# Patient Record
Sex: FEMALE | Race: WHITE | Employment: OTHER | URBAN - NONMETROPOLITAN AREA
[De-identification: names, ages, dates, MRNs, and addresses within clinical notes are randomized per-mention and may not be internally consistent; named-entity substitution may affect disease eponyms.]

---

## 2017-03-21 ENCOUNTER — TELEPHONE (OUTPATIENT)
Dept: INTERNAL MEDICINE | Age: 62
End: 2017-03-21

## 2017-03-21 RX ORDER — ESTRADIOL 0.5 MG/1
0.5 TABLET ORAL DAILY
Qty: 90 TABLET | Refills: 3 | Status: SHIPPED | OUTPATIENT
Start: 2017-03-21 | End: 2018-04-23 | Stop reason: SDUPTHER

## 2017-05-24 LAB
CHOLESTEROL, TOTAL: 189 MG/DL
CHOLESTEROL/HDL RATIO: 3.4
HBA1C MFR BLD: NORMAL %
HDLC SERPL-MCNC: 56 MG/DL (ref 35–70)
LDL CHOLESTEROL CALCULATED: 90 MG/DL (ref 0–160)
TRIGL SERPL-MCNC: 215 MG/DL
VLDLC SERPL CALC-MCNC: 43 MG/DL

## 2017-05-25 ENCOUNTER — TELEPHONE (OUTPATIENT)
Dept: FAMILY MEDICINE CLINIC | Age: 62
End: 2017-05-25

## 2017-05-30 ENCOUNTER — TELEPHONE (OUTPATIENT)
Dept: FAMILY MEDICINE CLINIC | Age: 62
End: 2017-05-30

## 2017-05-30 DIAGNOSIS — E03.9 HYPOTHYROIDISM, UNSPECIFIED TYPE: Primary | ICD-10-CM

## 2017-07-06 ENCOUNTER — OFFICE VISIT (OUTPATIENT)
Dept: FAMILY MEDICINE CLINIC | Age: 62
End: 2017-07-06
Payer: COMMERCIAL

## 2017-07-06 VITALS
HEART RATE: 68 BPM | HEIGHT: 63 IN | WEIGHT: 157 LBS | DIASTOLIC BLOOD PRESSURE: 70 MMHG | BODY MASS INDEX: 27.82 KG/M2 | SYSTOLIC BLOOD PRESSURE: 124 MMHG

## 2017-07-06 DIAGNOSIS — K21.9 GASTROESOPHAGEAL REFLUX DISEASE WITHOUT ESOPHAGITIS: Primary | ICD-10-CM

## 2017-07-06 DIAGNOSIS — E78.00 PURE HYPERCHOLESTEROLEMIA: ICD-10-CM

## 2017-07-06 DIAGNOSIS — Z79.890 HORMONE REPLACEMENT THERAPY: ICD-10-CM

## 2017-07-06 DIAGNOSIS — I87.2 VENOUS INSUFFICIENCY: ICD-10-CM

## 2017-07-06 DIAGNOSIS — R79.89 TSH ELEVATION: ICD-10-CM

## 2017-07-06 PROCEDURE — 99214 OFFICE O/P EST MOD 30 MIN: CPT | Performed by: FAMILY MEDICINE

## 2017-07-06 RX ORDER — CHOLECALCIFEROL (VITAMIN D3) 125 MCG
500 CAPSULE ORAL DAILY
COMMUNITY
End: 2017-07-06 | Stop reason: SDUPTHER

## 2017-07-06 RX ORDER — SIMVASTATIN 20 MG
20 TABLET ORAL NIGHTLY
Qty: 90 TABLET | Refills: 3 | Status: SHIPPED | OUTPATIENT
Start: 2017-07-06 | End: 2017-09-18 | Stop reason: SDUPTHER

## 2017-07-06 ASSESSMENT — ENCOUNTER SYMPTOMS
ALLERGIC/IMMUNOLOGIC NEGATIVE: 1
RESPIRATORY NEGATIVE: 1
BACK PAIN: 0
EYES NEGATIVE: 1
GASTROINTESTINAL NEGATIVE: 1

## 2017-07-06 ASSESSMENT — PATIENT HEALTH QUESTIONNAIRE - PHQ9
SUM OF ALL RESPONSES TO PHQ9 QUESTIONS 1 & 2: 0
SUM OF ALL RESPONSES TO PHQ QUESTIONS 1-9: 0
2. FEELING DOWN, DEPRESSED OR HOPELESS: 0
1. LITTLE INTEREST OR PLEASURE IN DOING THINGS: 0

## 2017-08-08 ENCOUNTER — OFFICE VISIT (OUTPATIENT)
Dept: OBGYN | Age: 62
End: 2017-08-08
Payer: COMMERCIAL

## 2017-08-08 ENCOUNTER — HOSPITAL ENCOUNTER (OUTPATIENT)
Age: 62
Setting detail: SPECIMEN
Discharge: HOME | End: 2017-08-08
Payer: COMMERCIAL

## 2017-08-08 VITALS
HEART RATE: 78 BPM | DIASTOLIC BLOOD PRESSURE: 80 MMHG | BODY MASS INDEX: 25.81 KG/M2 | HEIGHT: 64 IN | SYSTOLIC BLOOD PRESSURE: 124 MMHG | WEIGHT: 151.2 LBS

## 2017-08-08 DIAGNOSIS — Z01.419 WELL FEMALE EXAM WITH ROUTINE GYNECOLOGICAL EXAM: Primary | ICD-10-CM

## 2017-08-08 DIAGNOSIS — Z12.72 VAGINAL PAP SMEAR: ICD-10-CM

## 2017-08-08 DIAGNOSIS — Z12.31 SCREENING MAMMOGRAM, ENCOUNTER FOR: ICD-10-CM

## 2017-08-08 PROCEDURE — 99396 PREV VISIT EST AGE 40-64: CPT | Performed by: NURSE PRACTITIONER

## 2017-08-10 LAB — CYTOLOGY REPORT: NORMAL

## 2017-08-14 ENCOUNTER — OFFICE VISIT (OUTPATIENT)
Dept: OBGYN | Age: 62
End: 2017-08-14
Payer: COMMERCIAL

## 2017-08-14 VITALS
HEART RATE: 84 BPM | WEIGHT: 151.6 LBS | BODY MASS INDEX: 25.88 KG/M2 | SYSTOLIC BLOOD PRESSURE: 140 MMHG | DIASTOLIC BLOOD PRESSURE: 82 MMHG | HEIGHT: 64 IN

## 2017-08-14 DIAGNOSIS — L73.9 FOLLICULITIS: Primary | ICD-10-CM

## 2017-08-14 PROCEDURE — 99213 OFFICE O/P EST LOW 20 MIN: CPT | Performed by: NURSE PRACTITIONER

## 2017-09-19 RX ORDER — SIMVASTATIN 20 MG
TABLET ORAL
Qty: 90 TABLET | Refills: 1 | Status: SHIPPED | OUTPATIENT
Start: 2017-09-19 | End: 2018-03-23 | Stop reason: SDUPTHER

## 2018-01-02 ENCOUNTER — TELEPHONE (OUTPATIENT)
Dept: OBGYN | Age: 63
End: 2018-01-02

## 2018-01-04 ENCOUNTER — TELEPHONE (OUTPATIENT)
Dept: OBGYN | Age: 63
End: 2018-01-04

## 2018-01-04 RX ORDER — TRIAMCINOLONE ACETONIDE 1 MG/G
CREAM TOPICAL
Qty: 28 G | Refills: 1 | Status: SHIPPED | OUTPATIENT
Start: 2018-01-04 | End: 2018-08-23 | Stop reason: ALTCHOICE

## 2018-01-04 NOTE — TELEPHONE ENCOUNTER
Spoke with patient by phone. Complaining of itching and vaginal discharge. Minimal relief with OTC Monistat. Rx's sent to her pharmacy.   She will make appointment if symptoms persist

## 2018-03-24 RX ORDER — SIMVASTATIN 20 MG
TABLET ORAL
Qty: 90 TABLET | Refills: 3 | Status: SHIPPED | OUTPATIENT
Start: 2018-03-24 | End: 2019-03-21 | Stop reason: SDUPTHER

## 2018-04-24 RX ORDER — ESTRADIOL 0.5 MG/1
TABLET ORAL
Qty: 90 TABLET | Refills: 3 | Status: ON HOLD | OUTPATIENT
Start: 2018-04-24 | End: 2018-10-03

## 2018-06-06 LAB
CHOLESTEROL, TOTAL: 210 MG/DL
CHOLESTEROL/HDL RATIO: 4
CREATININE: 0.6 MG/DL
HDLC SERPL-MCNC: 53 MG/DL (ref 35–70)
LDL CHOLESTEROL CALCULATED: 129 MG/DL (ref 0–160)
TRIGL SERPL-MCNC: 142 MG/DL
VLDLC SERPL CALC-MCNC: 28 MG/DL

## 2018-08-23 ENCOUNTER — OFFICE VISIT (OUTPATIENT)
Dept: OBGYN | Age: 63
End: 2018-08-23
Payer: COMMERCIAL

## 2018-08-23 ENCOUNTER — HOSPITAL ENCOUNTER (OUTPATIENT)
Age: 63
Setting detail: SPECIMEN
Discharge: HOME | End: 2018-08-23
Payer: COMMERCIAL

## 2018-08-23 VITALS
RESPIRATION RATE: 16 BRPM | HEART RATE: 72 BPM | BODY MASS INDEX: 25.95 KG/M2 | SYSTOLIC BLOOD PRESSURE: 126 MMHG | DIASTOLIC BLOOD PRESSURE: 80 MMHG | WEIGHT: 152 LBS | HEIGHT: 64 IN

## 2018-08-23 DIAGNOSIS — Z78.0 MENOPAUSE: ICD-10-CM

## 2018-08-23 DIAGNOSIS — N63.10 LUMP OF RIGHT BREAST: ICD-10-CM

## 2018-08-23 DIAGNOSIS — Z12.31 VISIT FOR SCREENING MAMMOGRAM: ICD-10-CM

## 2018-08-23 DIAGNOSIS — Z12.72 SCREENING FOR VAGINAL CANCER: ICD-10-CM

## 2018-08-23 DIAGNOSIS — Z01.419 WELL FEMALE EXAM WITH ROUTINE GYNECOLOGICAL EXAM: Primary | ICD-10-CM

## 2018-08-23 PROCEDURE — G0145 SCR C/V CYTO,THINLAYER,RESCR: HCPCS

## 2018-08-23 PROCEDURE — 99396 PREV VISIT EST AGE 40-64: CPT | Performed by: NURSE PRACTITIONER

## 2018-08-23 NOTE — PROGRESS NOTES
Katarzyna Sow  2018              58 y.o. Chief Complaint   Patient presents with   [facility] Gynecologic Exam     lower back pain yesterday feels better today. No LMP recorded. Patient has had a hysterectomy. No referring provider defined for this encounter. HPI :Annual Exam  Patient presents for annual exam.  Counseling on healthy lifestyle reviewed, as well as the need for self breast exam. We reviewed the need for Kegal exercises. Good bladder control. We discussed and reviewed the need for routine screenings and immunization updates when appropriate. Performs monthly self breast exams. Mammogram scheduled tomorrow. On estradiol tablets and wishes to continue. We reviewed the pros, cons, risks, benefits, side effects and proper compliance. Patient verbalized understanding. Will call when in need of refill. Performs monthly self breast exams. The patient is sexually active. last pap: was normal, last mammogram: was normal  The patient has regular exercise: yes .  We did review the need for and frequency of both weight bearing and strengthening as tolerated by the patient. ________________________________________________________________________  Obstetric History       T3      L3     SAB0   TAB0   Ectopic0   Molar0   Multiple0   Live Births0       # Outcome Date GA Lbr Bubba/2nd Weight Sex Delivery Anes PTL Lv   3 Term     F Vag-Spont      2 Term     F Vag-Spont      1 Term     M Vag-Spont           Past Medical History:   Diagnosis Date    GERD (gastroesophageal reflux disease)     High cholesterol     Hormone replacement therapy     Postsurgical menopause     Age 49                                                                   Past Surgical History:   Procedure Laterality Date    COLONOSCOPY      normal -10 yrs    CYSTOCELE REPAIR      INGUINAL HERNIA REPAIR Right 06    ROTATOR CUFF REPAIR Right     ROTATOR CUFF REPAIR Left     Testing:  Date of Last Pap Smear: 2018  Date of Last Mammogram: 2017  Date of Last Colonoscopy: 2014  Date of Last Bone Density: many years  ________________________________________________________________________  REVIEW OF SYSTEMS:     A minimum of an eleven point review of systems was completed. Review Of Systems (11 point):  General ROS:  negative  Hematological and Lymphatic ROS:negative   Breast ROS: negative  Cardiovascular ROS: negative  Respiratory ROS: negative   Gastrointestinal ROS: negative  Genito-Urinary ROS: negative  Psychological ROS: negative  Neurological ROS: negative  Musculoskeletal ROS: negative  Dermatological ROS: negative                                                                                                                                                                                   PHYSICAL Exam:     Constitutional:  Blood pressure 126/80, pulse 72, resp. rate 16, height 5' 4\" (1.626 m), weight 152 lb (68.9 kg), not currently breastfeeding. General Appearance: This  is a well Developed, well Nourished, well groomed female. Her BMI was reviewed. Skin:  There was a Normal Inspection of the skin without rashes or lesions. There were no rashes. (Papular, Maculopapular, Hives, Pustular, Macular)     There were no lesions (Ulcers, Erythema, Abn. Appearing Nevi)      Lymphatic:  No Lymph Nodes were Palpable in the neck , axilla or groin. Neck and EENT:  The neck was supple. There were no masses   The thyroid was not enlarged and had no masses. PERRLA, Nares Patent No Masses    Respiratory: The lungs were auscultated and found to be clear. There were no rales, rhonchi or wheezes. There was a good respiratory effort. Cardiovascular: The heart was in a regular rate and rhythm. . No S3 or S4. There was no murmur appreciated. Extremities: The patients extremities were without calf tenderness, edema, or varicosities.   There was full range of motion in all four extremities. Pulses in all four extremities    Abdomen: The abdomen was soft and non-tender. There were good bowel sounds in all quadrants and there was no guarding, rebound or rigidity. On evaluation there was no evidence of hepatosplenomegaly and there was no costal vertebral chepe tenderness bilaterally. No hernias were appreciated. Psych: The patient had a normal Orientation to: Time, Place, Person, and Situation  Mood and affect appropriate    Breast:  (Chest)  normal appearance, no masses or tenderness, Inspection negative, No nipple retraction or dimpling, No nipple discharge or bleeding, No axillary or supraclavicular adenopathy, Normal to palpation without dominant masses, negative findings: normal in size and symmetry, normal contour with no evidence of flattening or dimpling, skin normal, nipples everted without rashes or discharge, palpation negative for masses or nodules, no palpable axillary lymphadenopathy      Pelvic Exam:  External genitalia: normal general appearance  Urinary system: urethral meatus normal  Vaginal: normal mucosa without prolapse or lesions, normal without tenderness, induration or masses and normal rugae  Cervix: absent and removed surgically  Adnexa: normal bimanual exam and non palpable  Uterus: absent and removed surgically  Thin prep Pap obtained from vaginal apex     Musculosk:  Normal Gait and station was noted. Digits were evaluated without abnormal findings. Range of motion, stability and strength were evaluated and found to be appropriate for the patients age. ASSESSMENT:      58 y.o. Annual   Diagnosis Orders   1. Well female exam with routine gynecological exam     2. Screening for vaginal cancer     3. Visit for screening mammogram          Chief Complaint   Patient presents with   [facility] Gynecologic Exam     lower back pain yesterday feels better today.           Past Medical History:   Diagnosis Date    GERD (gastroesophageal reflux disease)     High cholesterol     Hormone replacement therapy     Postsurgical menopause 2003    Age 49         Patient Active Problem List   Diagnosis    GERD (gastroesophageal reflux disease)    Hormone replacement therapy          Hereditary Breast, Ovarian, Colon and Uterine Cancer screening Done. Tobacco & Secondary smoke risks reviewed; instructed on cessation and avoidance    PLAN:  Return in about 1 year (around 8/23/2019) for Annual Exam.  Repeat pap per ASCCP 2013 guidelines  Return for annual exams  Mammograms every 1 year. If 36 yo and last mammogram was negative. Routine health maintenance per patients PCP. No orders of the defined types were placed in this encounter.       Jayne Morillo  8/23/2018      (Please note that portions of this note were completed with a voice-recognition program. Efforts were made to edit the dictations but occasionally words are mis-transcribed.)

## 2018-08-24 ENCOUNTER — HOSPITAL ENCOUNTER (OUTPATIENT)
Dept: ULTRASOUND IMAGING | Age: 63
Discharge: HOME | End: 2018-08-26
Payer: COMMERCIAL

## 2018-08-24 ENCOUNTER — HOSPITAL ENCOUNTER (OUTPATIENT)
Dept: MAMMOGRAPHY | Age: 63
Discharge: HOME | End: 2018-08-26
Payer: COMMERCIAL

## 2018-08-24 DIAGNOSIS — R92.8 ABNORMAL MAMMOGRAM: ICD-10-CM

## 2018-08-24 DIAGNOSIS — N63.10 LUMP OF RIGHT BREAST: ICD-10-CM

## 2018-08-24 PROCEDURE — 77066 DX MAMMO INCL CAD BI: CPT

## 2018-08-24 PROCEDURE — 76642 ULTRASOUND BREAST LIMITED: CPT

## 2018-08-28 DIAGNOSIS — N63.10 LUMP OF RIGHT BREAST: Primary | ICD-10-CM

## 2018-08-30 ENCOUNTER — TELEPHONE (OUTPATIENT)
Dept: SURGERY | Age: 63
End: 2018-08-30

## 2018-09-05 ENCOUNTER — INITIAL CONSULT (OUTPATIENT)
Dept: SURGERY | Age: 63
End: 2018-09-05
Payer: COMMERCIAL

## 2018-09-05 ENCOUNTER — TELEPHONE (OUTPATIENT)
Dept: SURGERY | Age: 63
End: 2018-09-05

## 2018-09-05 VITALS
HEIGHT: 64 IN | DIASTOLIC BLOOD PRESSURE: 54 MMHG | HEART RATE: 54 BPM | SYSTOLIC BLOOD PRESSURE: 106 MMHG | BODY MASS INDEX: 25.61 KG/M2 | WEIGHT: 150 LBS

## 2018-09-05 DIAGNOSIS — N63.10 BREAST MASS, RIGHT: Primary | ICD-10-CM

## 2018-09-05 PROCEDURE — 99214 OFFICE O/P EST MOD 30 MIN: CPT | Performed by: SURGERY

## 2018-09-05 RX ORDER — LANOLIN ALCOHOL/MO/W.PET/CERES
1000 CREAM (GRAM) TOPICAL DAILY
COMMUNITY

## 2018-09-05 RX ORDER — ACETAMINOPHEN 160 MG
TABLET,DISINTEGRATING ORAL
COMMUNITY

## 2018-09-05 NOTE — PROGRESS NOTES
 Other Sister         Graves Disease    Cancer Sister         thyroid cancer    Thyroid Disease Sister        Social History     Social History    Marital status:      Spouse name: N/A    Number of children: N/A    Years of education: N/A     Occupational History    Not on file. Social History Main Topics    Smoking status: Never Smoker    Smokeless tobacco: Never Used    Alcohol use 0.0 oz/week      Comment: occassionally    Drug use: No    Sexual activity: Yes     Partners: Male     Other Topics Concern    Not on file     Social History Narrative    No narrative on file       Past Surgical History:   Procedure Laterality Date   Susie Figueroa Left 2018    Dr. Daniel Gonzalez  06/14    normal -10 yrs   [facility] Right 01/24/06    ROTATOR CUFF REPAIR Right 2012    ROTATOR CUFF REPAIR Left 02/14    SERENITY AND BSO      TONSILLECTOMY      TUBAL LIGATION         Past Medical History:   Diagnosis Date    GERD (gastroesophageal reflux disease)     High cholesterol     Hormone replacement therapy     Postsurgical menopause 2003    Age 49       Current Outpatient Prescriptions on File Prior to Visit   Medication Sig Dispense Refill    simvastatin (ZOCOR) 20 MG tablet TAKE 1 TABLET NIGHTLY 90 tablet 3    Ascorbic Acid (VITAMIN C) 500 MG tablet Take 500 mg by mouth daily      Multiple Vitamins-Minerals (MULTIVITAMIN PO) Take 1 capsule by mouth daily      omeprazole (PRILOSEC) 20 MG capsule Take 20 mg by mouth daily.  estradiol (ESTRACE) 0.5 MG tablet TAKE ONE TABLET BY MOUTH ONCE DAILY 90 tablet 3     No current facility-administered medications on file prior to visit.         Allergies as of 09/05/2018 - Review Complete 09/05/2018   Allergen Reaction Noted    Lipitor [atorvastatin] Other (See Comments) 07/27/2015    Percocet [oxycodone-acetaminophen] Nausea Only 11/07/2013    Vicodin [hydrocodone-acetaminophen] Nausea Only 11/07/2013         PHYSICAL EXAM:    Blood pressure (!) 106/54, pulse 54, height 5' 4\" (1.626 m), weight 150 lb (68 kg), not currently breastfeeding. Gen:  A and O x 3, NAD, well nourished  Eyes:  Sclera non icterus, PERRL  Head:  Normocephalic, non-tender  Neck:  Supple, no adenopathy, thyroid non tender and no masses,no carotid bruits  Lungs:  CTA, symmetrical  Chest:  RRR, no murmurs  Abd:  Soft, NT, ND, no HSM, no hernias, no bruits  Ext:  No edema, no cyanosis  Psych: reveals appropriate mood, memory and judgment,  Neuro:  Reveals no gross motor or sensory deficits,   Msk:  5/5 strength all 4 extremities, no joint tenderness        Breast Exam:    Patient examined in sitting position with hands on hips. Nipple retraction: {YNo  Skin dimpling: No  Erythema:  No  Asymmetry: No  Axillary Lymphadenopathy:  No  Supra Clavicular Lymphadenopathy: No    Patient placed in a supine position. Right breast mass:  Yes,  There is about a 1.5 by 1.5 cm mass in the 6 oclock position and about 1-2 cm inferior to the areolar complex, mobile  Left breast mass:  No  Tenderness:  No        ASSESS MENT:    1. A mass right, 6 oclock 2 cm inferior to nipple. US shows complicated cyst.  We dicussed tx options , observation vs exc bx. We discussed the option of observation vs exc bx. There is a small risk of complicated cyst being cancer. They should be checked with US in and exam in 6 months or bxed. A simple cyst can be watched but a complicated and/or complex cysts should be followed clsoely or bx. Pt  Would like to proceed with exc bx    PLAN:    1.   Will set up for exc bx of mass right breast

## 2018-09-10 DIAGNOSIS — Z01.818 PRE-OP TESTING: Primary | ICD-10-CM

## 2018-09-11 LAB — CYTOLOGY REPORT: NORMAL

## 2018-09-13 ENCOUNTER — HOSPITAL ENCOUNTER (OUTPATIENT)
Dept: LAB | Age: 63
Setting detail: SPECIMEN
Discharge: HOME | End: 2018-09-13
Payer: COMMERCIAL

## 2018-09-13 ENCOUNTER — HOSPITAL ENCOUNTER (OUTPATIENT)
Dept: NON INVASIVE DIAGNOSTICS | Age: 63
Discharge: HOME | End: 2018-09-13
Payer: COMMERCIAL

## 2018-09-13 DIAGNOSIS — Z01.818 PRE-OP TESTING: ICD-10-CM

## 2018-09-13 LAB
ABSOLUTE EOS #: 0.3 K/UL (ref 0–0.4)
ABSOLUTE IMMATURE GRANULOCYTE: NORMAL K/UL (ref 0–0.3)
ABSOLUTE LYMPH #: 1.8 K/UL (ref 1–4.8)
ABSOLUTE MONO #: 0.5 K/UL (ref 0.1–1.2)
ANION GAP SERPL CALCULATED.3IONS-SCNC: 9 MMOL/L (ref 9–17)
BASOPHILS # BLD: 1 % (ref 0–1)
BASOPHILS ABSOLUTE: 0 K/UL (ref 0–0.2)
BUN BLDV-MCNC: 13 MG/DL (ref 8–23)
BUN/CREAT BLD: 19 (ref 9–20)
CALCIUM SERPL-MCNC: 10.2 MG/DL (ref 8.6–10.4)
CHLORIDE BLD-SCNC: 104 MMOL/L (ref 98–107)
CO2: 31 MMOL/L (ref 20–31)
CREAT SERPL-MCNC: 0.69 MG/DL (ref 0.5–0.9)
DIFFERENTIAL TYPE: NORMAL
EKG ATRIAL RATE: 53 BPM
EKG P AXIS: 54 DEGREES
EKG P-R INTERVAL: 148 MS
EKG Q-T INTERVAL: 442 MS
EKG QRS DURATION: 88 MS
EKG QTC CALCULATION (BAZETT): 414 MS
EKG R AXIS: -9 DEGREES
EKG T AXIS: -9 DEGREES
EKG VENTRICULAR RATE: 53 BPM
EOSINOPHILS RELATIVE PERCENT: 5 % (ref 1–7)
GFR AFRICAN AMERICAN: >60 ML/MIN
GFR NON-AFRICAN AMERICAN: >60 ML/MIN
GFR SERPL CREATININE-BSD FRML MDRD: NORMAL ML/MIN/{1.73_M2}
GFR SERPL CREATININE-BSD FRML MDRD: NORMAL ML/MIN/{1.73_M2}
GLUCOSE BLD-MCNC: 90 MG/DL (ref 70–99)
HCT VFR BLD CALC: 43.4 % (ref 36–46)
HEMOGLOBIN: 14.6 G/DL (ref 12–16)
IMMATURE GRANULOCYTES: NORMAL %
LYMPHOCYTES # BLD: 33 % (ref 16–46)
MCH RBC QN AUTO: 30.7 PG (ref 26–34)
MCHC RBC AUTO-ENTMCNC: 33.8 G/DL (ref 31–37)
MCV RBC AUTO: 91 FL (ref 80–100)
MONOCYTES # BLD: 9 % (ref 4–11)
NRBC AUTOMATED: NORMAL PER 100 WBC
PDW BLD-RTO: 13.2 % (ref 11–14.5)
PLATELET # BLD: 247 K/UL (ref 140–450)
PLATELET ESTIMATE: NORMAL
PMV BLD AUTO: 7.9 FL (ref 6–12)
POTASSIUM SERPL-SCNC: 4 MMOL/L (ref 3.7–5.3)
RBC # BLD: 4.76 M/UL (ref 4–5.2)
RBC # BLD: NORMAL 10*6/UL
SEG NEUTROPHILS: 52 % (ref 43–77)
SEGMENTED NEUTROPHILS ABSOLUTE COUNT: 2.9 K/UL (ref 1.8–7.7)
SODIUM BLD-SCNC: 144 MMOL/L (ref 135–144)
WBC # BLD: 5.5 K/UL (ref 3.5–11)
WBC # BLD: NORMAL 10*3/UL

## 2018-09-13 PROCEDURE — 80048 BASIC METABOLIC PNL TOTAL CA: CPT

## 2018-09-13 PROCEDURE — 93005 ELECTROCARDIOGRAM TRACING: CPT

## 2018-09-13 PROCEDURE — 36415 COLL VENOUS BLD VENIPUNCTURE: CPT

## 2018-09-13 PROCEDURE — 85025 COMPLETE CBC W/AUTO DIFF WBC: CPT

## 2018-09-14 NOTE — TELEPHONE ENCOUNTER
Patient had labs and an EKG. She is seeing Dr. Castro Keane on 9/24/2018 for cardiac clearance.
Patient notified. She will come on Wed. Morning to get pre op testing.
Pt is 58 yrs old and has no ekg on file and last labs are from 2015. I would be interested in a fresh set of labs and an EKG for a baseline.  Ok to proceed to surgery after these come back normal.     Mona Rodriguez APRN - CRNA,MSN,9/6/2018 6:34 PM  Staff Anesthetist  [facility]  Dept of Anesthesia
Appointment for surgery Clearance scheduled for:None     Preoperative Testing: These are the current and completed labs:  CBC:   Lab Results   Component Value Date    WBC 4.9 10/10/2015    RBC 4.68 10/10/2015    HGB 14.3 10/10/2015    HCT 43.5 10/10/2015    MCV 92.9 10/10/2015    RDW 13.3 10/10/2015     10/10/2015     CMP:   Lab Results   Component Value Date     10/10/2015    K 4.3 10/10/2015     10/10/2015    CO2 29 10/10/2015    BUN 13 10/10/2015    CREATININE 0.6 06/06/2018    GFRAA >60 10/10/2015    LABGLOM >60 10/10/2015    GLUCOSE 89 10/10/2015    CALCIUM 9.4 10/10/2015    ALT 13 10/10/2015     POC Tests: No results for input(s): POCGLU, POCNA, POCK, POCCL, POCBUN, POCHEMO, POCHCT in the last 72 hours.   Coags  No results found for: PROTIME, INR, APTT  HCG (If Applicable) No results found for: PREGTESTUR, PREGSERUM, HCG, HCGQUANT   ABGs No results found for: PHART, PO2ART, DCI0DAQ, VJD2PUZ, BEART, W7QLVUBS   Type & Screen (If Applicable)  No results found for: Lenin Parson    Additional ordered pre-operative testing:  []CBC    []ABG      [] BMP   []URINALYSIS   []CMP    []HCG   []COAGS PT/INR  []T&C  []LFTs   []TYPE AND SCREEN    [] EKG  [] Chest X-Ray  [] Other Radiology    [] Sent to Hospitalist None  [x] Sent to Anesthesia for your review: yes   [] Additional Orders: None     Comments:None   Requests: None    Signed: Randall Singh LPN 3/8/8751 8:60 PM

## 2018-09-20 ENCOUNTER — OFFICE VISIT (OUTPATIENT)
Dept: FAMILY MEDICINE CLINIC | Age: 63
End: 2018-09-20
Payer: COMMERCIAL

## 2018-09-20 VITALS
RESPIRATION RATE: 12 BRPM | HEIGHT: 63 IN | SYSTOLIC BLOOD PRESSURE: 132 MMHG | WEIGHT: 151 LBS | HEART RATE: 60 BPM | BODY MASS INDEX: 26.75 KG/M2 | DIASTOLIC BLOOD PRESSURE: 78 MMHG

## 2018-09-20 DIAGNOSIS — Z23 NEED FOR SHINGLES VACCINE: ICD-10-CM

## 2018-09-20 DIAGNOSIS — E78.00 PURE HYPERCHOLESTEROLEMIA: ICD-10-CM

## 2018-09-20 DIAGNOSIS — I87.2 VENOUS INSUFFICIENCY: ICD-10-CM

## 2018-09-20 DIAGNOSIS — K21.9 GASTROESOPHAGEAL REFLUX DISEASE WITHOUT ESOPHAGITIS: Primary | ICD-10-CM

## 2018-09-20 DIAGNOSIS — Z79.890 HORMONE REPLACEMENT THERAPY: ICD-10-CM

## 2018-09-20 DIAGNOSIS — Z11.4 ENCOUNTER FOR SCREENING FOR HIV: ICD-10-CM

## 2018-09-20 DIAGNOSIS — R79.89 TSH ELEVATION: ICD-10-CM

## 2018-09-20 PROCEDURE — 99214 OFFICE O/P EST MOD 30 MIN: CPT | Performed by: FAMILY MEDICINE

## 2018-09-20 ASSESSMENT — PATIENT HEALTH QUESTIONNAIRE - PHQ9
2. FEELING DOWN, DEPRESSED OR HOPELESS: 0
SUM OF ALL RESPONSES TO PHQ9 QUESTIONS 1 & 2: 0
SUM OF ALL RESPONSES TO PHQ QUESTIONS 1-9: 0
SUM OF ALL RESPONSES TO PHQ QUESTIONS 1-9: 0
1. LITTLE INTEREST OR PLEASURE IN DOING THINGS: 0

## 2018-09-20 ASSESSMENT — ENCOUNTER SYMPTOMS
GASTROINTESTINAL NEGATIVE: 1
EYES NEGATIVE: 1
ALLERGIC/IMMUNOLOGIC NEGATIVE: 1
BACK PAIN: 0
RESPIRATORY NEGATIVE: 1

## 2018-09-20 NOTE — PATIENT INSTRUCTIONS
having a problem with your medicine. · Your doctor may recommend over-the-counter medicine. For mild or occasional indigestion, antacids, such as Tums, Gaviscon, Mylanta, or Maalox, may help. Your doctor also may recommend over-the-counter acid reducers, such as Pepcid AC, Tagamet HB, Zantac 75, or Prilosec. Read and follow all instructions on the label. If you use these medicines often, talk with your doctor. · Change your eating habits. ¨ It's best to eat several small meals instead of two or three large meals. ¨ After you eat, wait 2 to 3 hours before you lie down. ¨ Chocolate, mint, and alcohol can make GERD worse. ¨ Spicy foods, foods that have a lot of acid (like tomatoes and oranges), and coffee can make GERD symptoms worse in some people. If your symptoms are worse after you eat a certain food, you may want to stop eating that food to see if your symptoms get better. · Do not smoke or chew tobacco. Smoking can make GERD worse. If you need help quitting, talk to your doctor about stop-smoking programs and medicines. These can increase your chances of quitting for good. · If you have GERD symptoms at night, raise the head of your bed 6 to 8 inches by putting the frame on blocks or placing a foam wedge under the head of your mattress. (Adding extra pillows does not work.)  · Do not wear tight clothing around your middle. · Lose weight if you need to. Losing just 5 to 10 pounds can help. When should you call for help? Call your doctor now or seek immediate medical care if:    · You have new or different belly pain.     · Your stools are black and tarlike or have streaks of blood.    Watch closely for changes in your health, and be sure to contact your doctor if:    · Your symptoms have not improved after 2 days.     · Food seems to catch in your throat or chest.   Where can you learn more? Go to [email or web address] and sign in to your [employer] account.  Enter G715 in the Search

## 2018-09-24 ENCOUNTER — OFFICE VISIT (OUTPATIENT)
Dept: CARDIOLOGY | Age: 63
End: 2018-09-24
Payer: COMMERCIAL

## 2018-09-24 VITALS
DIASTOLIC BLOOD PRESSURE: 78 MMHG | SYSTOLIC BLOOD PRESSURE: 138 MMHG | HEART RATE: 60 BPM | WEIGHT: 151 LBS | BODY MASS INDEX: 26.75 KG/M2 | HEIGHT: 63 IN

## 2018-09-24 DIAGNOSIS — R94.31 ABNORMAL ECG: Primary | ICD-10-CM

## 2018-09-24 DIAGNOSIS — E78.5 HYPERLIPIDEMIA, UNSPECIFIED HYPERLIPIDEMIA TYPE: ICD-10-CM

## 2018-09-24 PROCEDURE — 99243 OFF/OP CNSLTJ NEW/EST LOW 30: CPT | Performed by: INTERNAL MEDICINE

## 2018-09-24 NOTE — PATIENT INSTRUCTIONS
Stress Test      Please report to the [facility] Cardiopulmonary Testing Department located in the basement. Check in at the PHYSICAL THERAPY window. Central Scheduling [phone]. Please allow 2 hours to complete this test.      ~  Take regular medications with sips of water.       >  If you are walking on the treadmill, wear comfortable clothes and shoes.     Please do not apply lotion or oils to skin on the day of stress test.

## 2018-09-27 ENCOUNTER — TELEPHONE (OUTPATIENT)
Dept: CARDIOLOGY | Age: 63
End: 2018-09-27

## 2018-09-27 ENCOUNTER — HOSPITAL ENCOUNTER (OUTPATIENT)
Dept: NON INVASIVE DIAGNOSTICS | Age: 63
Discharge: HOME | End: 2018-09-27
Payer: COMMERCIAL

## 2018-09-27 DIAGNOSIS — R94.31 ABNORMAL ECG: ICD-10-CM

## 2018-09-27 LAB
LV EF: 63 %
LVEF MODALITY: NORMAL

## 2018-09-27 PROCEDURE — 93306 TTE W/DOPPLER COMPLETE: CPT

## 2018-09-27 PROCEDURE — 93017 CV STRESS TEST TRACING ONLY: CPT

## 2018-09-27 PROCEDURE — 93018 CV STRESS TEST I&R ONLY: CPT | Performed by: INTERNAL MEDICINE

## 2018-09-27 NOTE — TELEPHONE ENCOUNTER
Notified patient of  Stress test &  Echo  results. Instructed patient to keep upcoming appointment with cardiologist and to call our office for any questions. Patient also instructed to utilize the E.D. for any emergent health issues that may arise. Patient waiting for cardiac clearance for Rt breast excision on Oct 3rd with Dr Marcela Diamond .

## 2018-10-03 ENCOUNTER — ANESTHESIA (OUTPATIENT)
Dept: OPERATING ROOM | Age: 63
End: 2018-10-03
Payer: COMMERCIAL

## 2018-10-03 ENCOUNTER — ANESTHESIA EVENT (OUTPATIENT)
Dept: OPERATING ROOM | Age: 63
End: 2018-10-03
Payer: COMMERCIAL

## 2018-10-03 ENCOUNTER — HOSPITAL ENCOUNTER (OUTPATIENT)
Age: 63
Setting detail: OUTPATIENT SURGERY
Discharge: HOME | End: 2018-10-03
Attending: SURGERY | Admitting: SURGERY
Payer: COMMERCIAL

## 2018-10-03 VITALS
RESPIRATION RATE: 16 BRPM | DIASTOLIC BLOOD PRESSURE: 66 MMHG | TEMPERATURE: 96.5 F | HEART RATE: 47 BPM | SYSTOLIC BLOOD PRESSURE: 126 MMHG | HEIGHT: 63 IN | WEIGHT: 149.6 LBS | OXYGEN SATURATION: 97 % | BODY MASS INDEX: 26.51 KG/M2

## 2018-10-03 VITALS
RESPIRATION RATE: 9 BRPM | OXYGEN SATURATION: 97 % | SYSTOLIC BLOOD PRESSURE: 111 MMHG | DIASTOLIC BLOOD PRESSURE: 56 MMHG

## 2018-10-03 DIAGNOSIS — G89.18 POSTOPERATIVE PAIN: Primary | ICD-10-CM

## 2018-10-03 PROCEDURE — 3600000002 HC SURGERY LEVEL 2 BASE: Performed by: SURGERY

## 2018-10-03 PROCEDURE — 3700000000 HC ANESTHESIA ATTENDED CARE: Performed by: SURGERY

## 2018-10-03 PROCEDURE — 7100000011 HC PHASE II RECOVERY - ADDTL 15 MIN: Performed by: SURGERY

## 2018-10-03 PROCEDURE — 7100000010 HC PHASE II RECOVERY - FIRST 15 MIN: Performed by: SURGERY

## 2018-10-03 PROCEDURE — 19120 REMOVAL OF BREAST LESION: CPT | Performed by: SURGERY

## 2018-10-03 PROCEDURE — 3600000012 HC SURGERY LEVEL 2 ADDTL 15MIN: Performed by: SURGERY

## 2018-10-03 PROCEDURE — 6360000002 HC RX W HCPCS: Performed by: SURGERY

## 2018-10-03 PROCEDURE — 2580000003 HC RX 258: Performed by: SURGERY

## 2018-10-03 PROCEDURE — 6360000002 HC RX W HCPCS: Performed by: NURSE ANESTHETIST, CERTIFIED REGISTERED

## 2018-10-03 PROCEDURE — 00400 ANES INTEGUMENTARY SYS NOS: CPT | Performed by: NURSE ANESTHETIST, CERTIFIED REGISTERED

## 2018-10-03 PROCEDURE — 6360000002 HC RX W HCPCS

## 2018-10-03 PROCEDURE — 88305 TISSUE EXAM BY PATHOLOGIST: CPT

## 2018-10-03 PROCEDURE — 3700000001 HC ADD 15 MINUTES (ANESTHESIA): Performed by: SURGERY

## 2018-10-03 PROCEDURE — 2709999900 HC NON-CHARGEABLE SUPPLY: Performed by: SURGERY

## 2018-10-03 RX ORDER — MIDAZOLAM HYDROCHLORIDE 1 MG/ML
INJECTION INTRAMUSCULAR; INTRAVENOUS PRN
Status: DISCONTINUED | OUTPATIENT
Start: 2018-10-03 | End: 2018-10-03 | Stop reason: SDUPTHER

## 2018-10-03 RX ORDER — HYDROCODONE BITARTRATE AND ACETAMINOPHEN 5; 325 MG/1; MG/1
2 TABLET ORAL PRN
Status: DISCONTINUED | OUTPATIENT
Start: 2018-10-03 | End: 2018-10-03 | Stop reason: HOSPADM

## 2018-10-03 RX ORDER — HYDROCODONE BITARTRATE AND ACETAMINOPHEN 5; 325 MG/1; MG/1
1 TABLET ORAL PRN
Status: DISCONTINUED | OUTPATIENT
Start: 2018-10-03 | End: 2018-10-03 | Stop reason: HOSPADM

## 2018-10-03 RX ORDER — FENTANYL CITRATE 50 UG/ML
INJECTION, SOLUTION INTRAMUSCULAR; INTRAVENOUS PRN
Status: DISCONTINUED | OUTPATIENT
Start: 2018-10-03 | End: 2018-10-03 | Stop reason: SDUPTHER

## 2018-10-03 RX ORDER — FENTANYL CITRATE 50 UG/ML
50 INJECTION, SOLUTION INTRAMUSCULAR; INTRAVENOUS EVERY 5 MIN PRN
Status: DISCONTINUED | OUTPATIENT
Start: 2018-10-03 | End: 2018-10-03 | Stop reason: HOSPADM

## 2018-10-03 RX ORDER — MEPERIDINE HYDROCHLORIDE 50 MG/ML
12.5 INJECTION INTRAMUSCULAR; INTRAVENOUS; SUBCUTANEOUS EVERY 5 MIN PRN
Status: DISCONTINUED | OUTPATIENT
Start: 2018-10-03 | End: 2018-10-03 | Stop reason: HOSPADM

## 2018-10-03 RX ORDER — PROPOFOL 10 MG/ML
INJECTION, EMULSION INTRAVENOUS PRN
Status: DISCONTINUED | OUTPATIENT
Start: 2018-10-03 | End: 2018-10-03 | Stop reason: SDUPTHER

## 2018-10-03 RX ORDER — DIPHENHYDRAMINE HYDROCHLORIDE 50 MG/ML
12.5 INJECTION INTRAMUSCULAR; INTRAVENOUS
Status: DISCONTINUED | OUTPATIENT
Start: 2018-10-03 | End: 2018-10-03 | Stop reason: HOSPADM

## 2018-10-03 RX ORDER — SODIUM CHLORIDE 0.9 % (FLUSH) 0.9 %
10 SYRINGE (ML) INJECTION EVERY 12 HOURS SCHEDULED
Status: DISCONTINUED | OUTPATIENT
Start: 2018-10-03 | End: 2018-10-03 | Stop reason: HOSPADM

## 2018-10-03 RX ORDER — KETOROLAC TROMETHAMINE 30 MG/ML
INJECTION, SOLUTION INTRAMUSCULAR; INTRAVENOUS PRN
Status: DISCONTINUED | OUTPATIENT
Start: 2018-10-03 | End: 2018-10-03 | Stop reason: SDUPTHER

## 2018-10-03 RX ORDER — FENTANYL CITRATE 50 UG/ML
25 INJECTION, SOLUTION INTRAMUSCULAR; INTRAVENOUS EVERY 5 MIN PRN
Status: DISCONTINUED | OUTPATIENT
Start: 2018-10-03 | End: 2018-10-03 | Stop reason: HOSPADM

## 2018-10-03 RX ORDER — PROPOFOL 10 MG/ML
INJECTION, EMULSION INTRAVENOUS CONTINUOUS PRN
Status: DISCONTINUED | OUTPATIENT
Start: 2018-10-03 | End: 2018-10-03 | Stop reason: SDUPTHER

## 2018-10-03 RX ORDER — DEXAMETHASONE SODIUM PHOSPHATE 4 MG/ML
INJECTION, SOLUTION INTRA-ARTICULAR; INTRALESIONAL; INTRAMUSCULAR; INTRAVENOUS; SOFT TISSUE PRN
Status: DISCONTINUED | OUTPATIENT
Start: 2018-10-03 | End: 2018-10-03 | Stop reason: SDUPTHER

## 2018-10-03 RX ORDER — ONDANSETRON 2 MG/ML
4 INJECTION INTRAMUSCULAR; INTRAVENOUS
Status: DISCONTINUED | OUTPATIENT
Start: 2018-10-03 | End: 2018-10-03 | Stop reason: HOSPADM

## 2018-10-03 RX ORDER — SODIUM CHLORIDE, SODIUM LACTATE, POTASSIUM CHLORIDE, CALCIUM CHLORIDE 600; 310; 30; 20 MG/100ML; MG/100ML; MG/100ML; MG/100ML
INJECTION, SOLUTION INTRAVENOUS CONTINUOUS
Status: DISCONTINUED | OUTPATIENT
Start: 2018-10-03 | End: 2018-10-03 | Stop reason: HOSPADM

## 2018-10-03 RX ORDER — MORPHINE SULFATE 2 MG/ML
2 INJECTION, SOLUTION INTRAMUSCULAR; INTRAVENOUS EVERY 5 MIN PRN
Status: DISCONTINUED | OUTPATIENT
Start: 2018-10-03 | End: 2018-10-03 | Stop reason: HOSPADM

## 2018-10-03 RX ORDER — HYDROCODONE BITARTRATE AND ACETAMINOPHEN 5; 325 MG/1; MG/1
2 TABLET ORAL EVERY 6 HOURS PRN
Qty: 20 TABLET | Refills: 0 | Status: SHIPPED | OUTPATIENT
Start: 2018-10-03 | End: 2018-10-10

## 2018-10-03 RX ORDER — MORPHINE SULFATE 2 MG/ML
1 INJECTION, SOLUTION INTRAMUSCULAR; INTRAVENOUS EVERY 5 MIN PRN
Status: DISCONTINUED | OUTPATIENT
Start: 2018-10-03 | End: 2018-10-03 | Stop reason: HOSPADM

## 2018-10-03 RX ORDER — SODIUM CHLORIDE 0.9 % (FLUSH) 0.9 %
10 SYRINGE (ML) INJECTION PRN
Status: DISCONTINUED | OUTPATIENT
Start: 2018-10-03 | End: 2018-10-03 | Stop reason: HOSPADM

## 2018-10-03 RX ADMIN — PROPOFOL 100 MCG/KG/MIN: 10 INJECTION, EMULSION INTRAVENOUS at 08:03

## 2018-10-03 RX ADMIN — PROPOFOL 50 MG: 10 INJECTION, EMULSION INTRAVENOUS at 08:03

## 2018-10-03 RX ADMIN — DEXAMETHASONE SODIUM PHOSPHATE 4 MG: 4 INJECTION, SOLUTION INTRAMUSCULAR; INTRAVENOUS at 08:26

## 2018-10-03 RX ADMIN — SODIUM CHLORIDE, POTASSIUM CHLORIDE, SODIUM LACTATE AND CALCIUM CHLORIDE: 600; 310; 30; 20 INJECTION, SOLUTION INTRAVENOUS at 07:33

## 2018-10-03 RX ADMIN — KETOROLAC TROMETHAMINE 30 MG: 30 INJECTION, SOLUTION INTRAMUSCULAR at 08:26

## 2018-10-03 RX ADMIN — Medication 2 G: at 08:00

## 2018-10-03 RX ADMIN — SODIUM CHLORIDE, POTASSIUM CHLORIDE, SODIUM LACTATE AND CALCIUM CHLORIDE: 600; 310; 30; 20 INJECTION, SOLUTION INTRAVENOUS at 07:55

## 2018-10-03 RX ADMIN — MIDAZOLAM HYDROCHLORIDE 2 MG: 1 INJECTION, SOLUTION INTRAMUSCULAR; INTRAVENOUS at 07:55

## 2018-10-03 RX ADMIN — SODIUM CHLORIDE, POTASSIUM CHLORIDE, SODIUM LACTATE AND CALCIUM CHLORIDE: 600; 310; 30; 20 INJECTION, SOLUTION INTRAVENOUS at 09:23

## 2018-10-03 RX ADMIN — FENTANYL CITRATE 100 MCG: 50 INJECTION, SOLUTION INTRAMUSCULAR; INTRAVENOUS at 07:55

## 2018-10-03 ASSESSMENT — PAIN SCALES - GENERAL
PAINLEVEL_OUTOF10: 0

## 2018-10-03 ASSESSMENT — PAIN - FUNCTIONAL ASSESSMENT: PAIN_FUNCTIONAL_ASSESSMENT: 0-10

## 2018-10-03 NOTE — ANESTHESIA POSTPROCEDURE EVALUATION
Department of Anesthesiology  Postprocedure Note    Patient: Sj Menendez  MRN: [id number]  YOB: 1955  Date of evaluation: 10/3/2018  Time:  8:41 AM     Procedure Summary     Date:  10/03/18 Room / Location:  [address] [place] [address]    Anesthesia Start:  7185 Anesthesia Stop:  2180    Procedure:  Excision Right Breast Cyst (Right ) Diagnosis:  (right breast cyst )    Surgeon:  Erick Ortiz MD Responsible Provider:  DILIP Beasley CRNA    Anesthesia Type:  MAC ASA Status:  2          Anesthesia Type: MAC    Benito Phase I: Benito Score: 10    Benito Phase II: Benito Score: 10    Last vitals: Reviewed and per EMR flowsheets.        Anesthesia Post Evaluation    Patient location during evaluation: PACU  Patient participation: complete - patient participated  Level of consciousness: awake and alert  Pain score: 0  Airway patency: patent  Nausea & Vomiting: no nausea and no vomiting  Complications: no  Cardiovascular status: blood pressure returned to baseline and hemodynamically stable  Respiratory status: acceptable, spontaneous ventilation and room air  Hydration status: euvolemic

## 2018-10-03 NOTE — ANESTHESIA PRE PROCEDURE
Diagnosis Code    GERD (gastroesophageal reflux disease) K21.9    Hormone replacement therapy Z79.890       Past Medical History:        Diagnosis Date    GERD (gastroesophageal reflux disease)     High cholesterol     Hormone replacement therapy     Postsurgical menopause 2003    Age 49       Past Surgical History:        Procedure Laterality Date    CARPAL TUNNEL RELEASE Left 2018    Dr. Talat Coronado  06/14    normal -10 yrs    CYSTOCELE REPAIR      INGUINAL HERNIA REPAIR Right 01/24/06    ROTATOR CUFF REPAIR Right 2012    ROTATOR CUFF REPAIR Left 02/14    SERENITY AND BSO      TONSILLECTOMY      TUBAL LIGATION         Social History:    Social History   Substance Use Topics    Smoking status: Never Smoker    Smokeless tobacco: Never Used    Alcohol use 0.0 oz/week      Comment: occasionally                                Counseling given: Not Answered      Vital Signs (Current):   Vitals:    10/03/18 0708   BP: (!) 148/81   Pulse: 61   Resp: 16   Temp: 36.4 °C (97.6 °F)   TempSrc: Temporal   SpO2: 94%   Weight: 149 lb 9.6 oz (67.9 kg)   Height: 5' 3\" (1.6 m)                                              BP Readings from Last 3 Encounters:   10/03/18 (!) 148/81   09/24/18 138/78   09/20/18 132/78       NPO Status: Time of last liquid consumption: 1800                        Time of last solid consumption: 1800                        Date of last liquid consumption: 10/02/18                        Date of last solid food consumption: 10/02/18    BMI:   Wt Readings from Last 3 Encounters:   10/03/18 149 lb 9.6 oz (67.9 kg)   09/24/18 151 lb (68.5 kg)   09/20/18 151 lb (68.5 kg)     Body mass index is 26.5 kg/m².     CBC:   Lab Results   Component Value Date    WBC 5.5 09/13/2018    RBC 4.76 09/13/2018    HGB 14.6 09/13/2018    HCT 43.4 09/13/2018    MCV 91.0 09/13/2018    RDW 13.2 09/13/2018     09/13/2018       CMP:   Lab Results   Component Value Date     09/13/2018    K 4.0

## 2018-10-03 NOTE — H&P
Pressure Mother      High Cholesterol Mother      Thyroid Disease Mother      Other Sister           Graves Disease    Cancer Sister           thyroid cancer    Thyroid Disease Sister              Social History   Social History            Social History    Marital status:        Spouse name: N/A    Number of children: N/A    Years of education: N/A          Occupational History    Not on file. Social History Main Topics    Smoking status: Never Smoker    Smokeless tobacco: Never Used    Alcohol use 0.0 oz/week         Comment: occassionally    Drug use: No    Sexual activity: Yes       Partners: Male           Other Topics Concern    Not on file          Social History Narrative    No narrative on file            Past Surgical History         Past Surgical History:   Procedure Laterality Date    CARPAL TUNNEL RELEASE Left 2018     Dr. Allyssa Dong   06/14     normal -10 yrs   [address] Right 01/24/06    ROTATOR CUFF REPAIR Right 2012    ROTATOR CUFF REPAIR Left 02/14    SERENITY AND BSO        TONSILLECTOMY        TUBAL LIGATION                Past Medical History        Past Medical History:   Diagnosis Date    GERD (gastroesophageal reflux disease)      High cholesterol      Hormone replacement therapy      Postsurgical menopause 2003     Age 49                   Current Outpatient Prescriptions on File Prior to Visit   Medication Sig Dispense Refill    simvastatin (ZOCOR) 20 MG tablet TAKE 1 TABLET NIGHTLY 90 tablet 3    Ascorbic Acid (VITAMIN C) 500 MG tablet Take 500 mg by mouth daily        Multiple Vitamins-Minerals (MULTIVITAMIN PO) Take 1 capsule by mouth daily        omeprazole (PRILOSEC) 20 MG capsule Take 20 mg by mouth daily.  estradiol (ESTRACE) 0.5 MG tablet TAKE ONE TABLET BY MOUTH ONCE DAILY 90 tablet 3      No current facility-administered medications on file prior to visit.           Allergies as

## 2018-10-04 LAB — SURGICAL PATHOLOGY REPORT: NORMAL

## 2018-10-16 ENCOUNTER — OFFICE VISIT (OUTPATIENT)
Dept: SURGERY | Age: 63
End: 2018-10-16

## 2018-10-16 VITALS
TEMPERATURE: 97.2 F | BODY MASS INDEX: 26.4 KG/M2 | SYSTOLIC BLOOD PRESSURE: 130 MMHG | HEART RATE: 56 BPM | HEIGHT: 63 IN | DIASTOLIC BLOOD PRESSURE: 80 MMHG | WEIGHT: 149 LBS

## 2018-10-16 DIAGNOSIS — Z09 POSTOP CHECK: Primary | ICD-10-CM

## 2018-10-16 PROCEDURE — 99024 POSTOP FOLLOW-UP VISIT: CPT | Performed by: SURGERY

## 2018-12-11 ENCOUNTER — HOSPITAL ENCOUNTER (OUTPATIENT)
Dept: BONE DENSITY | Age: 63
Discharge: HOME | End: 2018-12-13
Payer: COMMERCIAL

## 2018-12-11 DIAGNOSIS — Z78.0 MENOPAUSE: ICD-10-CM

## 2018-12-11 PROCEDURE — 77085 DXA BONE DENSITY AXL VRT FX: CPT

## 2019-01-18 NOTE — PROGRESS NOTES
Pau Nelson APRN CNP   You 19 hours ago (1:51 PM)      She may go back to lasix 40 mg BID which is what she was on previously, this is equivalent to have therapeutic level of her recent Demadex dosing. If she has any weight gain, increased edema or increased shortness of breath I would not hesitate to increase her Lasix to 60 mg BID.   Thanks,   Pau Nelson (Routing comment)         Historical Provider, MD   omeprazole (PRILOSEC) 20 MG capsule Take 20 mg by mouth daily. Yes Historical Provider, MD       Allergies:  Lipitor [atorvastatin]; Percocet [oxycodone-acetaminophen]; and Vicodin [hydrocodone-acetaminophen]    Social History:   reports that she has never smoked. She has never used smokeless tobacco. She reports that she drinks alcohol. She reports that she does not use drugs. ROS: Otherwise 10 systems reviewed and negative. Ht 5' 3\" (1.6 m)   Wt 151 lb (68.5 kg)   LMP  (LMP Unknown)   BMI 26.75 kg/m²   Vitals:    09/24/18 1323   BP: 138/78   Pulse: 60       Vitals as above. Alert and oriented x 3. No JVD, or carotid bruits. Lungs are clear to auscultation. Heart sounds are regular, normal, no murmur. Abdomen is soft, no tenderness. Extremities No peripheral edema    Cardiac Data:  EKG: Sinus bradycardia, T wave abnormality, anterior ischemia    Labs:     CBC: No results for input(s): WBC, HGB, HCT, PLT in the last 72 hours. BMP: No results for input(s): NA, K, CO2, BUN, CREATININE, LABGLOM, GLUCOSE in the last 72 hours. PT/INR: No results for input(s): PROTIME, INR in the last 72 hours. FASTING LIPID PANEL:  Lab Results   Component Value Date    HDL 53 06/06/2018    LDLCALC 129 06/06/2018    TRIG 142 06/06/2018     LIVER PROFILE:No results for input(s): AST, ALT, LABALBU in the last 72 hours. Assessment and plan:    -Abnormal ECG and preop evaluation. Treadmill only stress test and TTE.  -Hyperlipidemia- continue statin.   -RTC 12 months    Shannen Alicea 05[address] Cardiology Consult           [phone]

## 2019-03-16 ENCOUNTER — HOSPITAL ENCOUNTER (OUTPATIENT)
Dept: LAB | Age: 64
Discharge: HOME | End: 2019-03-16
Payer: COMMERCIAL

## 2019-03-16 DIAGNOSIS — E78.00 PURE HYPERCHOLESTEROLEMIA: ICD-10-CM

## 2019-03-16 DIAGNOSIS — I87.2 VENOUS INSUFFICIENCY: ICD-10-CM

## 2019-03-16 DIAGNOSIS — Z79.890 HORMONE REPLACEMENT THERAPY: ICD-10-CM

## 2019-03-16 DIAGNOSIS — R79.89 TSH ELEVATION: ICD-10-CM

## 2019-03-16 DIAGNOSIS — Z11.4 ENCOUNTER FOR SCREENING FOR HIV: ICD-10-CM

## 2019-03-16 LAB
ABSOLUTE EOS #: 0.2 K/UL (ref 0–0.4)
ABSOLUTE IMMATURE GRANULOCYTE: NORMAL K/UL (ref 0–0.3)
ABSOLUTE LYMPH #: 1.7 K/UL (ref 1–4.8)
ABSOLUTE MONO #: 0.4 K/UL (ref 0.1–1.2)
ALBUMIN SERPL-MCNC: 4.3 G/DL (ref 3.5–5.2)
ALBUMIN/GLOBULIN RATIO: 1.5 (ref 1–2.5)
ALP BLD-CCNC: 100 U/L (ref 35–104)
ALT SERPL-CCNC: 16 U/L (ref 5–33)
ANION GAP SERPL CALCULATED.3IONS-SCNC: 13 MMOL/L (ref 9–17)
AST SERPL-CCNC: 17 U/L
BASOPHILS # BLD: 1 % (ref 0–1)
BASOPHILS ABSOLUTE: 0 K/UL (ref 0–0.2)
BILIRUB SERPL-MCNC: 0.48 MG/DL (ref 0.3–1.2)
BUN BLDV-MCNC: 11 MG/DL (ref 8–23)
BUN/CREAT BLD: 17 (ref 9–20)
CALCIUM SERPL-MCNC: 9.8 MG/DL (ref 8.6–10.4)
CHLORIDE BLD-SCNC: 104 MMOL/L (ref 98–107)
CHOLESTEROL/HDL RATIO: 4
CHOLESTEROL: 202 MG/DL
CO2: 29 MMOL/L (ref 20–31)
CREAT SERPL-MCNC: 0.65 MG/DL (ref 0.5–0.9)
DIFFERENTIAL TYPE: NORMAL
EOSINOPHILS RELATIVE PERCENT: 4 % (ref 1–7)
GFR AFRICAN AMERICAN: >60 ML/MIN
GFR NON-AFRICAN AMERICAN: >60 ML/MIN
GFR SERPL CREATININE-BSD FRML MDRD: ABNORMAL ML/MIN/{1.73_M2}
GFR SERPL CREATININE-BSD FRML MDRD: ABNORMAL ML/MIN/{1.73_M2}
GLUCOSE BLD-MCNC: 90 MG/DL (ref 70–99)
HCT VFR BLD CALC: 44.8 % (ref 36–46)
HDLC SERPL-MCNC: 51 MG/DL
HEMOGLOBIN: 14.5 G/DL (ref 12–16)
HIV AG/AB: NONREACTIVE
IMMATURE GRANULOCYTES: NORMAL %
LDL CHOLESTEROL: 120 MG/DL (ref 0–130)
LYMPHOCYTES # BLD: 38 % (ref 16–46)
MCH RBC QN AUTO: 29.7 PG (ref 26–34)
MCHC RBC AUTO-ENTMCNC: 32.3 G/DL (ref 31–37)
MCV RBC AUTO: 92 FL (ref 80–100)
MONOCYTES # BLD: 9 % (ref 4–11)
NRBC AUTOMATED: NORMAL PER 100 WBC
PDW BLD-RTO: 13.6 % (ref 11–14.5)
PLATELET # BLD: 233 K/UL (ref 140–450)
PLATELET ESTIMATE: NORMAL
PMV BLD AUTO: 8 FL (ref 6–12)
POTASSIUM SERPL-SCNC: 3.8 MMOL/L (ref 3.7–5.3)
RBC # BLD: 4.87 M/UL (ref 4–5.2)
RBC # BLD: NORMAL 10*6/UL
SEG NEUTROPHILS: 48 % (ref 43–77)
SEGMENTED NEUTROPHILS ABSOLUTE COUNT: 2.2 K/UL (ref 1.8–7.7)
SODIUM BLD-SCNC: 146 MMOL/L (ref 135–144)
THYROXINE, FREE: 0.93 NG/DL (ref 0.93–1.7)
TOTAL PROTEIN: 7.2 G/DL (ref 6.4–8.3)
TRIGL SERPL-MCNC: 155 MG/DL
TSH SERPL DL<=0.05 MIU/L-ACNC: 3.41 MIU/L (ref 0.3–5)
VLDLC SERPL CALC-MCNC: ABNORMAL MG/DL (ref 1–30)
WBC # BLD: 4.5 K/UL (ref 3.5–11)
WBC # BLD: NORMAL 10*3/UL

## 2019-03-16 PROCEDURE — 36415 COLL VENOUS BLD VENIPUNCTURE: CPT

## 2019-03-16 PROCEDURE — 80061 LIPID PANEL: CPT

## 2019-03-16 PROCEDURE — 80053 COMPREHEN METABOLIC PANEL: CPT

## 2019-03-16 PROCEDURE — 84443 ASSAY THYROID STIM HORMONE: CPT

## 2019-03-16 PROCEDURE — 84439 ASSAY OF FREE THYROXINE: CPT

## 2019-03-16 PROCEDURE — 87389 HIV-1 AG W/HIV-1&-2 AB AG IA: CPT

## 2019-03-16 PROCEDURE — 85025 COMPLETE CBC W/AUTO DIFF WBC: CPT

## 2019-03-21 ENCOUNTER — OFFICE VISIT (OUTPATIENT)
Dept: FAMILY MEDICINE CLINIC | Age: 64
End: 2019-03-21
Payer: COMMERCIAL

## 2019-03-21 VITALS
HEART RATE: 68 BPM | WEIGHT: 151 LBS | BODY MASS INDEX: 26.75 KG/M2 | HEIGHT: 63 IN | DIASTOLIC BLOOD PRESSURE: 70 MMHG | SYSTOLIC BLOOD PRESSURE: 126 MMHG

## 2019-03-21 DIAGNOSIS — Z79.890 HORMONE REPLACEMENT THERAPY: ICD-10-CM

## 2019-03-21 DIAGNOSIS — E78.00 PURE HYPERCHOLESTEROLEMIA: ICD-10-CM

## 2019-03-21 DIAGNOSIS — R79.89 TSH ELEVATION: ICD-10-CM

## 2019-03-21 DIAGNOSIS — K21.9 GASTROESOPHAGEAL REFLUX DISEASE WITHOUT ESOPHAGITIS: Primary | ICD-10-CM

## 2019-03-21 DIAGNOSIS — I87.2 VENOUS INSUFFICIENCY: ICD-10-CM

## 2019-03-21 PROCEDURE — 99214 OFFICE O/P EST MOD 30 MIN: CPT | Performed by: FAMILY MEDICINE

## 2019-03-21 RX ORDER — SIMVASTATIN 20 MG
TABLET ORAL
Qty: 90 TABLET | Refills: 3 | Status: SHIPPED | OUTPATIENT
Start: 2019-03-21 | End: 2020-03-16

## 2019-03-21 ASSESSMENT — PATIENT HEALTH QUESTIONNAIRE - PHQ9
SUM OF ALL RESPONSES TO PHQ9 QUESTIONS 1 & 2: 0
SUM OF ALL RESPONSES TO PHQ QUESTIONS 1-9: 0
1. LITTLE INTEREST OR PLEASURE IN DOING THINGS: 0
2. FEELING DOWN, DEPRESSED OR HOPELESS: 0
SUM OF ALL RESPONSES TO PHQ QUESTIONS 1-9: 0

## 2019-03-21 ASSESSMENT — ENCOUNTER SYMPTOMS
RESPIRATORY NEGATIVE: 1
GASTROINTESTINAL NEGATIVE: 1
BACK PAIN: 0
EYES NEGATIVE: 1
ALLERGIC/IMMUNOLOGIC NEGATIVE: 1

## 2019-08-26 ENCOUNTER — OFFICE VISIT (OUTPATIENT)
Dept: OBGYN | Age: 64
End: 2019-08-26
Payer: COMMERCIAL

## 2019-08-26 ENCOUNTER — HOSPITAL ENCOUNTER (OUTPATIENT)
Age: 64
Setting detail: SPECIMEN
Discharge: HOME | End: 2019-08-26
Payer: COMMERCIAL

## 2019-08-26 VITALS
DIASTOLIC BLOOD PRESSURE: 80 MMHG | HEART RATE: 58 BPM | WEIGHT: 147 LBS | HEIGHT: 63 IN | BODY MASS INDEX: 26.05 KG/M2 | SYSTOLIC BLOOD PRESSURE: 114 MMHG

## 2019-08-26 DIAGNOSIS — Z01.419 WELL FEMALE EXAM WITH ROUTINE GYNECOLOGICAL EXAM: Primary | ICD-10-CM

## 2019-08-26 DIAGNOSIS — Z12.72 SCREENING FOR VAGINAL CANCER: ICD-10-CM

## 2019-08-26 DIAGNOSIS — Z12.31 OTHER SCREENING MAMMOGRAM: ICD-10-CM

## 2019-08-26 PROCEDURE — G0145 SCR C/V CYTO,THINLAYER,RESCR: HCPCS

## 2019-08-26 PROCEDURE — 99397 PER PM REEVAL EST PAT 65+ YR: CPT | Performed by: NURSE PRACTITIONER

## 2019-08-26 NOTE — PROGRESS NOTES
Matheus Sotomayor  2019              71 y.o. Chief Complaint   Patient presents with    Gynecologic Exam     pap         No LMP recorded (lmp unknown). Patient has had a hysterectomy. No referring provider defined for this encounter. HPI :Annual Exam  Patient presents for annual exam.  Counseling on healthy lifestyle reviewed, as well as the need for self breast exam. We reviewed the need for K[place] exercises. Occasional GAEL. We discussed and reviewed the need for routine screenings and immunization updates when appropriate. Performs monthly self breast exams. Had benign breast biopsy last year. There are no questions or concerns of a gynecologic nature. The patient is sexually active. last pap: was normal  The patient has regular exercise: walks. . We did review the need for and frequency of both weight bearing and strengthening as tolerated by the patient. ________________________________________________________________________  OB History    Para Term  AB Living   3 3 3 0 0 3   SAB TAB Ectopic Molar Multiple Live Births   0 0 0 0 0 0      # Outcome Date GA Lbr Bubba/2nd Weight Sex Delivery Anes PTL Lv   3 Term     F Vag-Spont      2 Term     F Vag-Spont      1 Term     M Vag-Spont        Past Medical History:   Diagnosis Date    GERD (gastroesophageal reflux disease)     High cholesterol     Hormone replacement therapy     Postsurgical menopause 2003    Age 49                                                                   Past Surgical History:   Procedure Laterality Date    CARPAL TUNNEL RELEASE Left 2018    Dr. Renee Andrade, right 2018    COLONOSCOPY      normal -10 yrs    CYSTOCELE REPAIR      INGUINAL HERNIA REPAIR Right 06    [place] REMOVAL OF BREAST LESION Right 10/3/2018    Excision Right Breast Cyst performed by Calista Betancourt MD at [address] [place] [address].   COLLAPSED CYST WITH MURAL ORGANIZING ACTIVE INFLAMMATION AND       ROTATOR CUFF REPAIR Right    

## 2019-08-30 ENCOUNTER — HOSPITAL ENCOUNTER (OUTPATIENT)
Dept: MAMMOGRAPHY | Age: 64
Discharge: HOME | End: 2019-09-01
Payer: COMMERCIAL

## 2019-08-30 DIAGNOSIS — Z12.31 OTHER SCREENING MAMMOGRAM: ICD-10-CM

## 2019-08-30 PROCEDURE — 77063 BREAST TOMOSYNTHESIS BI: CPT

## 2019-09-03 LAB — CYTOLOGY REPORT: NORMAL

## 2019-09-21 ENCOUNTER — HOSPITAL ENCOUNTER (OUTPATIENT)
Dept: LAB | Age: 64
Discharge: HOME | End: 2019-09-21
Payer: COMMERCIAL

## 2019-09-21 DIAGNOSIS — E78.00 PURE HYPERCHOLESTEROLEMIA: ICD-10-CM

## 2019-09-21 DIAGNOSIS — R79.89 TSH ELEVATION: ICD-10-CM

## 2019-09-21 DIAGNOSIS — K21.9 GASTROESOPHAGEAL REFLUX DISEASE WITHOUT ESOPHAGITIS: ICD-10-CM

## 2019-09-21 LAB
ABSOLUTE EOS #: 0.2 K/UL (ref 0–0.4)
ABSOLUTE IMMATURE GRANULOCYTE: NORMAL K/UL (ref 0–0.3)
ABSOLUTE LYMPH #: 1.9 K/UL (ref 1–4.8)
ABSOLUTE MONO #: 0.6 K/UL (ref 0.1–1.2)
ALBUMIN SERPL-MCNC: 4.6 G/DL (ref 3.5–5.2)
ALBUMIN/GLOBULIN RATIO: 1.6 (ref 1–2.5)
ALP BLD-CCNC: 106 U/L (ref 35–104)
ALT SERPL-CCNC: 15 U/L (ref 5–33)
ANION GAP SERPL CALCULATED.3IONS-SCNC: 12 MMOL/L (ref 9–17)
AST SERPL-CCNC: 18 U/L
BASOPHILS # BLD: 1 % (ref 0–1)
BASOPHILS ABSOLUTE: 0 K/UL (ref 0–0.2)
BILIRUB SERPL-MCNC: 0.53 MG/DL (ref 0.3–1.2)
BUN BLDV-MCNC: 12 MG/DL (ref 8–23)
BUN/CREAT BLD: 16 (ref 9–20)
CALCIUM SERPL-MCNC: 10.2 MG/DL (ref 8.6–10.4)
CHLORIDE BLD-SCNC: 105 MMOL/L (ref 98–107)
CHOLESTEROL/HDL RATIO: 4.1
CHOLESTEROL: 215 MG/DL
CO2: 28 MMOL/L (ref 20–31)
CREAT SERPL-MCNC: 0.74 MG/DL (ref 0.5–0.9)
DIFFERENTIAL TYPE: NORMAL
EOSINOPHILS RELATIVE PERCENT: 3 % (ref 1–7)
GFR AFRICAN AMERICAN: >60 ML/MIN
GFR NON-AFRICAN AMERICAN: >60 ML/MIN
GFR SERPL CREATININE-BSD FRML MDRD: ABNORMAL ML/MIN/{1.73_M2}
GFR SERPL CREATININE-BSD FRML MDRD: ABNORMAL ML/MIN/{1.73_M2}
GLUCOSE BLD-MCNC: 98 MG/DL (ref 70–99)
HCT VFR BLD CALC: 45.4 % (ref 36–46)
HDLC SERPL-MCNC: 53 MG/DL
HEMOGLOBIN: 15 G/DL (ref 12–16)
IMMATURE GRANULOCYTES: NORMAL %
LDL CHOLESTEROL: 126 MG/DL (ref 0–130)
LYMPHOCYTES # BLD: 33 % (ref 16–46)
MCH RBC QN AUTO: 30.1 PG (ref 26–34)
MCHC RBC AUTO-ENTMCNC: 33 G/DL (ref 31–37)
MCV RBC AUTO: 91.2 FL (ref 80–100)
MONOCYTES # BLD: 10 % (ref 4–11)
NRBC AUTOMATED: NORMAL PER 100 WBC
PDW BLD-RTO: 12.6 % (ref 11–14.5)
PLATELET # BLD: 263 K/UL (ref 140–450)
PLATELET ESTIMATE: NORMAL
PMV BLD AUTO: 7.9 FL (ref 6–12)
POTASSIUM SERPL-SCNC: 4.1 MMOL/L (ref 3.7–5.3)
RBC # BLD: 4.98 M/UL (ref 4–5.2)
RBC # BLD: NORMAL 10*6/UL
SEG NEUTROPHILS: 53 % (ref 43–77)
SEGMENTED NEUTROPHILS ABSOLUTE COUNT: 3.1 K/UL (ref 1.8–7.7)
SODIUM BLD-SCNC: 145 MMOL/L (ref 135–144)
THYROXINE, FREE: 0.98 NG/DL (ref 0.93–1.7)
TOTAL PROTEIN: 7.4 G/DL (ref 6.4–8.3)
TRIGL SERPL-MCNC: 179 MG/DL
TSH SERPL DL<=0.05 MIU/L-ACNC: 3.75 MIU/L (ref 0.3–5)
VLDLC SERPL CALC-MCNC: ABNORMAL MG/DL (ref 1–30)
WBC # BLD: 5.7 K/UL (ref 3.5–11)
WBC # BLD: NORMAL 10*3/UL

## 2019-09-21 PROCEDURE — 84439 ASSAY OF FREE THYROXINE: CPT

## 2019-09-21 PROCEDURE — 84443 ASSAY THYROID STIM HORMONE: CPT

## 2019-09-21 PROCEDURE — 80053 COMPREHEN METABOLIC PANEL: CPT

## 2019-09-21 PROCEDURE — 80061 LIPID PANEL: CPT

## 2019-09-21 PROCEDURE — 85025 COMPLETE CBC W/AUTO DIFF WBC: CPT

## 2019-09-21 PROCEDURE — 36415 COLL VENOUS BLD VENIPUNCTURE: CPT

## 2019-09-24 ENCOUNTER — OFFICE VISIT (OUTPATIENT)
Dept: FAMILY MEDICINE CLINIC | Age: 64
End: 2019-09-24
Payer: COMMERCIAL

## 2019-09-24 VITALS
SYSTOLIC BLOOD PRESSURE: 124 MMHG | BODY MASS INDEX: 26.05 KG/M2 | DIASTOLIC BLOOD PRESSURE: 70 MMHG | HEIGHT: 63 IN | HEART RATE: 68 BPM | WEIGHT: 147.05 LBS

## 2019-09-24 DIAGNOSIS — I87.2 VENOUS INSUFFICIENCY: ICD-10-CM

## 2019-09-24 DIAGNOSIS — K21.9 GASTROESOPHAGEAL REFLUX DISEASE WITHOUT ESOPHAGITIS: Primary | ICD-10-CM

## 2019-09-24 DIAGNOSIS — Z79.890 HORMONE REPLACEMENT THERAPY: ICD-10-CM

## 2019-09-24 DIAGNOSIS — E78.00 PURE HYPERCHOLESTEROLEMIA: ICD-10-CM

## 2019-09-24 DIAGNOSIS — R79.89 TSH ELEVATION: ICD-10-CM

## 2019-09-24 PROCEDURE — 99214 OFFICE O/P EST MOD 30 MIN: CPT | Performed by: FAMILY MEDICINE

## 2019-09-24 ASSESSMENT — ENCOUNTER SYMPTOMS
ALLERGIC/IMMUNOLOGIC NEGATIVE: 1
BACK PAIN: 0
RESPIRATORY NEGATIVE: 1
GASTROINTESTINAL NEGATIVE: 1
EYES NEGATIVE: 1

## 2019-09-24 ASSESSMENT — PATIENT HEALTH QUESTIONNAIRE - PHQ9
1. LITTLE INTEREST OR PLEASURE IN DOING THINGS: 0
SUM OF ALL RESPONSES TO PHQ9 QUESTIONS 1 & 2: 0
SUM OF ALL RESPONSES TO PHQ QUESTIONS 1-9: 0
2. FEELING DOWN, DEPRESSED OR HOPELESS: 0
SUM OF ALL RESPONSES TO PHQ QUESTIONS 1-9: 0

## 2019-09-24 NOTE — PROGRESS NOTES
Negative. Gastrointestinal: Negative. Genitourinary: Negative. Musculoskeletal: Negative. Negative for arthralgias and back pain. Skin: Negative. Allergic/Immunologic: Negative. Neurological: Negative. Hematological: Negative. Psychiatric/Behavioral: Negative. Objective:   Physical Exam   Constitutional: She is oriented to person, place, and time. She appears well-developed and well-nourished. No distress. HENT:   Head: Normocephalic and atraumatic. Right Ear: External ear normal.   Left Ear: External ear normal.   Nose: Nose normal.   Mouth/Throat: Oropharynx is clear and moist. No oropharyngeal exudate. Eyes: Conjunctivae and EOM are normal.   Neck: Neck supple. No thyromegaly present. Cardiovascular: Normal rate, regular rhythm, normal heart sounds and intact distal pulses. No murmur heard. Pulmonary/Chest: Effort normal. No respiratory distress. She has no wheezes. Abdominal: Soft. Bowel sounds are normal. She exhibits no distension. There is no tenderness. Musculoskeletal: She exhibits no edema or tenderness. Lymphadenopathy:     She has no cervical adenopathy. Neurological: She is alert and oriented to person, place, and time. Skin: Skin is warm and dry. No rash noted. No erythema. Psychiatric: She has a normal mood and affect.  Her behavior is normal. Judgment and thought content normal.     /70 (Site: Right Upper Arm, Position: Sitting, Cuff Size: Large Adult)   Pulse 68   Ht 5' 2.99\" (1.6 m)   Wt 147 lb 0.8 oz (66.7 kg)   LMP  (LMP Unknown)   BMI 26.05 kg/m²   Results for orders placed or performed during the hospital encounter of 09/21/19   TSH without Reflex   Result Value Ref Range    TSH 3.75 0.30 - 5.00 mIU/L   T4, Free   Result Value Ref Range    Thyroxine, Free 0.98 0.93 - 1.70 ng/dL   CBC Auto Differential   Result Value Ref Range    WBC 5.7 3.5 - 11.0 k/uL    RBC 4.98 4.0 - 5.2 m/uL    Hemoglobin 15.0 12.0 - 16.0 g/dL    Hematocrit 45.4 36 - 46 %    MCV 91.2 80 - 100 fL    MCH 30.1 26 - 34 pg    MCHC 33.0 31 - 37 g/dL    RDW 12.6 11.0 - 14.5 %    Platelets 173 732 - 761 k/uL    MPV 7.9 6.0 - 12.0 fL    NRBC Automated NOT REPORTED per 100 WBC    Differential Type NOT REPORTED     Immature Granulocytes NOT REPORTED 0 %    Absolute Immature Granulocyte NOT REPORTED 0.00 - 0.30 k/uL    WBC Morphology NOT REPORTED     RBC Morphology NOT REPORTED     Platelet Estimate NOT REPORTED     Seg Neutrophils 53 43 - 77 %    Lymphocytes 33 16 - 46 %    Monocytes 10 4 - 11 %    Eosinophils % 3 1 - 7 %    Basophils 1 0 - 1 %    Segs Absolute 3.10 1.8 - 7.7 k/uL    Absolute Lymph # 1.90 1.0 - 4.8 k/uL    Absolute Mono # 0.60 0.1 - 1.2 k/uL    Absolute Eos # 0.20 0.0 - 0.4 k/uL    Basophils Absolute 0.00 0.0 - 0.2 k/uL   Comprehensive Metabolic Panel   Result Value Ref Range    Glucose 98 70 - 99 mg/dL    BUN 12 8 - 23 mg/dL    CREATININE 0.74 0.50 - 0.90 mg/dL    Bun/Cre Ratio 16 9 - 20    Calcium 10.2 8.6 - 10.4 mg/dL    Sodium 145 (H) 135 - 144 mmol/L    Potassium 4.1 3.7 - 5.3 mmol/L    Chloride 105 98 - 107 mmol/L    CO2 28 20 - 31 mmol/L    Anion Gap 12 9 - 17 mmol/L    Alkaline Phosphatase 106 (H) 35 - 104 U/L    ALT 15 5 - 33 U/L    AST 18 <32 U/L    Total Bilirubin 0.53 0.3 - 1.2 mg/dL    Total Protein 7.4 6.4 - 8.3 g/dL    Alb 4.6 3.5 - 5.2 g/dL    Albumin/Globulin Ratio 1.6 1.0 - 2.5    GFR Non-African American >60 >60 mL/min    GFR African American >60 >60 mL/min    GFR Comment          GFR Staging NOT REPORTED    Lipid Panel   Result Value Ref Range    Cholesterol 215 (H) <200 mg/dL    HDL 53 >40 mg/dL    LDL Cholesterol 126 0 - 130 mg/dL    Chol/HDL Ratio 4.1 <5    Triglycerides 179 (H) <150 mg/dL    VLDL NOT REPORTED (H) 1 - 30 mg/dL       Assessment:       Encounter Diagnoses   Name Primary?     Gastroesophageal reflux disease without esophagitis Yes    Hormone replacement therapy     Pure hypercholesterolemia     Venous insufficiency     TSH elevation          Plan:      gerd: quiescent at present on daily omeprazole. Recurrent symptoms with taper at qod dosing, so she went back to daily dosing. Cont. Same. HRT: followed by Andrzej Seymour. S/p Dipak and bso. She has stopped this after having breast lesion of concern. Pap/pelvic/mammograms up to date. .  Some concerns for a complex cyst in the lower right breast.   excisional biopsy due to complex appearance of the cyst completed 10/3/19  BREAST TISSUES, EXCISION (RIGHT):       - COLLAPSED CYST WITH MURAL ORGANIZING ACTIVE INFLAMMATION AND       FIBROSIS.     - SMALL DUCTAL PAPILLOMA.       - RARE FOCI OF ATYPICAL DUCTAL HYPERPLASIA. DEXA scan normal 12/11/18. Cont. Calcium and vitamin D     Hyperlipidemia: improved/stable , on simvastatin . Will cont. Same dose for now. Mild venous insufficiency by report. Variable onset, not daily at present. Discussed management options. Observing at present. Improved at present. Mild tsh elevation with work labs at 5.910 (.45-4.5), follow up with our labs normal as above. May screen again at follow up as t4 at lower limits of normal range. Planning flu vaccine late October.

## 2020-01-02 ENCOUNTER — HOSPITAL ENCOUNTER (OUTPATIENT)
Dept: GENERAL RADIOLOGY | Age: 65
Discharge: HOME | End: 2020-01-04
Payer: COMMERCIAL

## 2020-01-02 PROCEDURE — 71046 X-RAY EXAM CHEST 2 VIEWS: CPT

## 2020-03-16 RX ORDER — SIMVASTATIN 20 MG
TABLET ORAL
Qty: 90 TABLET | Refills: 3 | Status: SHIPPED | OUTPATIENT
Start: 2020-03-16 | End: 2020-03-24 | Stop reason: SDUPTHER

## 2020-03-21 ENCOUNTER — HOSPITAL ENCOUNTER (OUTPATIENT)
Dept: LAB | Age: 65
Discharge: HOME | End: 2020-03-21
Payer: MEDICARE

## 2020-03-21 LAB
ABSOLUTE EOS #: 0.29 K/UL (ref 0–0.44)
ABSOLUTE IMMATURE GRANULOCYTE: <0.03 K/UL (ref 0–0.3)
ABSOLUTE LYMPH #: 2.11 K/UL (ref 1.1–3.7)
ABSOLUTE MONO #: 0.54 K/UL (ref 0.1–1.2)
ALBUMIN SERPL-MCNC: 4.8 G/DL (ref 3.5–5.2)
ALBUMIN/GLOBULIN RATIO: 1.8 (ref 1–2.5)
ALP BLD-CCNC: 114 U/L (ref 35–104)
ALT SERPL-CCNC: 14 U/L (ref 5–33)
ANION GAP SERPL CALCULATED.3IONS-SCNC: 12 MMOL/L (ref 9–17)
AST SERPL-CCNC: 19 U/L
BASOPHILS # BLD: 1 % (ref 0–2)
BASOPHILS ABSOLUTE: 0.04 K/UL (ref 0–0.2)
BILIRUB SERPL-MCNC: 0.53 MG/DL (ref 0.3–1.2)
BUN BLDV-MCNC: 13 MG/DL (ref 8–23)
BUN/CREAT BLD: 20 (ref 9–20)
CALCIUM SERPL-MCNC: 10.2 MG/DL (ref 8.6–10.4)
CHLORIDE BLD-SCNC: 102 MMOL/L (ref 98–107)
CHOLESTEROL/HDL RATIO: 3.4
CHOLESTEROL: 214 MG/DL
CO2: 30 MMOL/L (ref 20–31)
CREAT SERPL-MCNC: 0.65 MG/DL (ref 0.5–0.9)
DIFFERENTIAL TYPE: ABNORMAL
EOSINOPHILS RELATIVE PERCENT: 3 % (ref 1–4)
GFR AFRICAN AMERICAN: >60 ML/MIN
GFR NON-AFRICAN AMERICAN: >60 ML/MIN
GFR SERPL CREATININE-BSD FRML MDRD: ABNORMAL ML/MIN/{1.73_M2}
GFR SERPL CREATININE-BSD FRML MDRD: ABNORMAL ML/MIN/{1.73_M2}
GLUCOSE BLD-MCNC: 90 MG/DL (ref 70–99)
HCT VFR BLD CALC: 46.4 % (ref 36.3–47.1)
HDLC SERPL-MCNC: 63 MG/DL
HEMOGLOBIN: 14.7 G/DL (ref 11.9–15.1)
IMMATURE GRANULOCYTES: 0 %
LDL CHOLESTEROL: 116 MG/DL (ref 0–130)
LYMPHOCYTES # BLD: 24 % (ref 24–43)
MCH RBC QN AUTO: 29.5 PG (ref 25.2–33.5)
MCHC RBC AUTO-ENTMCNC: 31.7 G/DL (ref 25.2–33.5)
MCV RBC AUTO: 93 FL (ref 82.6–102.9)
MONOCYTES # BLD: 6 % (ref 3–12)
NRBC AUTOMATED: 0 PER 100 WBC
PDW BLD-RTO: 12.7 % (ref 11.8–14.4)
PLATELET # BLD: 268 K/UL (ref 138–453)
PLATELET ESTIMATE: ABNORMAL
PMV BLD AUTO: 9.8 FL (ref 8.1–13.5)
POTASSIUM SERPL-SCNC: 3.9 MMOL/L (ref 3.7–5.3)
RBC # BLD: 4.99 M/UL (ref 3.95–5.11)
RBC # BLD: ABNORMAL 10*6/UL
SEG NEUTROPHILS: 66 % (ref 36–65)
SEGMENTED NEUTROPHILS ABSOLUTE COUNT: 5.78 K/UL (ref 1.5–8.1)
SODIUM BLD-SCNC: 144 MMOL/L (ref 135–144)
THYROXINE, FREE: 1.02 NG/DL (ref 0.93–1.7)
TOTAL PROTEIN: 7.5 G/DL (ref 6.4–8.3)
TRIGL SERPL-MCNC: 176 MG/DL
TSH SERPL DL<=0.05 MIU/L-ACNC: 4.74 MIU/L (ref 0.3–5)
VLDLC SERPL CALC-MCNC: ABNORMAL MG/DL (ref 1–30)
WBC # BLD: 8.8 K/UL (ref 3.5–11.3)
WBC # BLD: ABNORMAL 10*3/UL

## 2020-03-21 PROCEDURE — 84439 ASSAY OF FREE THYROXINE: CPT

## 2020-03-21 PROCEDURE — 84443 ASSAY THYROID STIM HORMONE: CPT

## 2020-03-21 PROCEDURE — 36415 COLL VENOUS BLD VENIPUNCTURE: CPT

## 2020-03-21 PROCEDURE — 80053 COMPREHEN METABOLIC PANEL: CPT

## 2020-03-21 PROCEDURE — 80061 LIPID PANEL: CPT

## 2020-03-21 PROCEDURE — 85025 COMPLETE CBC W/AUTO DIFF WBC: CPT

## 2020-03-24 ENCOUNTER — OFFICE VISIT (OUTPATIENT)
Dept: FAMILY MEDICINE CLINIC | Age: 65
End: 2020-03-24
Payer: MEDICARE

## 2020-03-24 VITALS
BODY MASS INDEX: 24.98 KG/M2 | SYSTOLIC BLOOD PRESSURE: 122 MMHG | WEIGHT: 146.3 LBS | HEART RATE: 66 BPM | DIASTOLIC BLOOD PRESSURE: 72 MMHG | HEIGHT: 64 IN | OXYGEN SATURATION: 98 %

## 2020-03-24 PROCEDURE — 99213 OFFICE O/P EST LOW 20 MIN: CPT

## 2020-03-24 PROCEDURE — 1036F TOBACCO NON-USER: CPT | Performed by: FAMILY MEDICINE

## 2020-03-24 PROCEDURE — 99214 OFFICE O/P EST MOD 30 MIN: CPT | Performed by: FAMILY MEDICINE

## 2020-03-24 PROCEDURE — 1090F PRES/ABSN URINE INCON ASSESS: CPT | Performed by: FAMILY MEDICINE

## 2020-03-24 PROCEDURE — G8482 FLU IMMUNIZE ORDER/ADMIN: HCPCS | Performed by: FAMILY MEDICINE

## 2020-03-24 PROCEDURE — G8427 DOCREV CUR MEDS BY ELIG CLIN: HCPCS | Performed by: FAMILY MEDICINE

## 2020-03-24 PROCEDURE — G8400 PT W/DXA NO RESULTS DOC: HCPCS | Performed by: FAMILY MEDICINE

## 2020-03-24 PROCEDURE — G8417 CALC BMI ABV UP PARAM F/U: HCPCS | Performed by: FAMILY MEDICINE

## 2020-03-24 PROCEDURE — 4040F PNEUMOC VAC/ADMIN/RCVD: CPT | Performed by: FAMILY MEDICINE

## 2020-03-24 PROCEDURE — 1123F ACP DISCUSS/DSCN MKR DOCD: CPT | Performed by: FAMILY MEDICINE

## 2020-03-24 PROCEDURE — 3017F COLORECTAL CA SCREEN DOC REV: CPT | Performed by: FAMILY MEDICINE

## 2020-03-24 RX ORDER — OMEPRAZOLE 20 MG/1
20 CAPSULE, DELAYED RELEASE ORAL DAILY
Qty: 90 CAPSULE | Refills: 3 | Status: SHIPPED | OUTPATIENT
Start: 2020-03-24 | End: 2020-12-22

## 2020-03-24 RX ORDER — SIMVASTATIN 20 MG
TABLET ORAL
Qty: 90 TABLET | Refills: 3 | Status: SHIPPED | OUTPATIENT
Start: 2020-03-24 | End: 2020-09-28

## 2020-03-24 ASSESSMENT — PATIENT HEALTH QUESTIONNAIRE - PHQ9
2. FEELING DOWN, DEPRESSED OR HOPELESS: 0
SUM OF ALL RESPONSES TO PHQ QUESTIONS 1-9: 0
1. LITTLE INTEREST OR PLEASURE IN DOING THINGS: 0
SUM OF ALL RESPONSES TO PHQ9 QUESTIONS 1 & 2: 0
SUM OF ALL RESPONSES TO PHQ QUESTIONS 1-9: 0

## 2020-03-24 ASSESSMENT — ENCOUNTER SYMPTOMS
ALLERGIC/IMMUNOLOGIC NEGATIVE: 1
EYES NEGATIVE: 1
RESPIRATORY NEGATIVE: 1
GASTROINTESTINAL NEGATIVE: 1
BACK PAIN: 0

## 2020-03-24 NOTE — PROGRESS NOTES
Results for orders placed or performed during the hospital encounter of 03/21/20   CBC Auto Differential   Result Value Ref Range    WBC 8.8 3.5 - 11.3 k/uL    RBC 4.99 3.95 - 5.11 m/uL    Hemoglobin 14.7 11.9 - 15.1 g/dL    Hematocrit 46.4 36.3 - 47.1 %    MCV 93.0 82.6 - 102.9 fL    MCH 29.5 25.2 - 33.5 pg    MCHC 31.7 25.2 - 33.5 g/dL    RDW 12.7 11.8 - 14.4 %    Platelets 923 384 - 061 k/uL    MPV 9.8 8.1 - 13.5 fL    NRBC Automated 0.0 0.0 per 100 WBC    Differential Type NOT REPORTED     WBC Morphology NOT REPORTED     RBC Morphology NOT REPORTED     Platelet Estimate NOT REPORTED     Seg Neutrophils 66 (H) 36 - 65 %    Lymphocytes 24 24 - 43 %    Monocytes 6 3 - 12 %    Eosinophils % 3 1 - 4 %    Basophils 1 0 - 2 %    Immature Granulocytes 0 0 %    Segs Absolute 5.78 1.50 - 8.10 k/uL    Absolute Lymph # 2.11 1.10 - 3.70 k/uL    Absolute Mono # 0.54 0.10 - 1.20 k/uL    Absolute Eos # 0.29 0.00 - 0.44 k/uL    Basophils Absolute 0.04 0.00 - 0.20 k/uL    Absolute Immature Granulocyte <0.03 0.00 - 0.30 k/uL   Comprehensive Metabolic Panel   Result Value Ref Range    Glucose 90 70 - 99 mg/dL    BUN 13 8 - 23 mg/dL    CREATININE 0.65 0.50 - 0.90 mg/dL    Bun/Cre Ratio 20 9 - 20    Calcium 10.2 8.6 - 10.4 mg/dL    Sodium 144 135 - 144 mmol/L    Potassium 3.9 3.7 - 5.3 mmol/L    Chloride 102 98 - 107 mmol/L    CO2 30 20 - 31 mmol/L    Anion Gap 12 9 - 17 mmol/L    Alkaline Phosphatase 114 (H) 35 - 104 U/L    ALT 14 5 - 33 U/L    AST 19 <32 U/L    Total Bilirubin 0.53 0.3 - 1.2 mg/dL    Total Protein 7.5 6.4 - 8.3 g/dL    Alb 4.8 3.5 - 5.2 g/dL    Albumin/Globulin Ratio 1.8 1.0 - 2.5    GFR Non-African American >60 >60 mL/min    GFR African American >60 >60 mL/min    GFR Comment          GFR Staging NOT REPORTED    Lipid Panel   Result Value Ref Range    Cholesterol 214 (H) <200 mg/dL    HDL 63 >40 mg/dL    LDL Cholesterol 116 0 - 130 mg/dL    Chol/HDL Ratio 3.4 <5    Triglycerides 176 (H) <150 mg/dL    VLDL NOT

## 2020-05-31 ENCOUNTER — HOSPITAL ENCOUNTER (OUTPATIENT)
Age: 65
Discharge: HOME | End: 2020-06-02
Payer: MEDICARE

## 2020-05-31 ENCOUNTER — HOSPITAL ENCOUNTER (OUTPATIENT)
Dept: GENERAL RADIOLOGY | Age: 65
Discharge: HOME | End: 2020-06-02
Payer: MEDICARE

## 2020-05-31 ENCOUNTER — OFFICE VISIT (OUTPATIENT)
Dept: PRIMARY CARE CLINIC | Age: 65
End: 2020-05-31
Payer: MEDICARE

## 2020-05-31 VITALS
HEART RATE: 60 BPM | SYSTOLIC BLOOD PRESSURE: 130 MMHG | OXYGEN SATURATION: 99 % | DIASTOLIC BLOOD PRESSURE: 80 MMHG | WEIGHT: 147 LBS | BODY MASS INDEX: 25.23 KG/M2

## 2020-05-31 PROCEDURE — G8417 CALC BMI ABV UP PARAM F/U: HCPCS | Performed by: PHYSICIAN ASSISTANT

## 2020-05-31 PROCEDURE — G8427 DOCREV CUR MEDS BY ELIG CLIN: HCPCS | Performed by: PHYSICIAN ASSISTANT

## 2020-05-31 PROCEDURE — MISC295 DJO ARM SLING WITH SWATHE: Performed by: PHYSICIAN ASSISTANT

## 2020-05-31 PROCEDURE — 73110 X-RAY EXAM OF WRIST: CPT

## 2020-05-31 PROCEDURE — 99213 OFFICE O/P EST LOW 20 MIN: CPT | Performed by: PHYSICIAN ASSISTANT

## 2020-05-31 PROCEDURE — 1036F TOBACCO NON-USER: CPT | Performed by: PHYSICIAN ASSISTANT

## 2020-05-31 PROCEDURE — 29125 APPL SHORT ARM SPLINT STATIC: CPT | Performed by: PHYSICIAN ASSISTANT

## 2020-05-31 PROCEDURE — G8400 PT W/DXA NO RESULTS DOC: HCPCS | Performed by: PHYSICIAN ASSISTANT

## 2020-05-31 PROCEDURE — 4040F PNEUMOC VAC/ADMIN/RCVD: CPT | Performed by: PHYSICIAN ASSISTANT

## 2020-05-31 PROCEDURE — 99212 OFFICE O/P EST SF 10 MIN: CPT

## 2020-05-31 PROCEDURE — 3017F COLORECTAL CA SCREEN DOC REV: CPT | Performed by: PHYSICIAN ASSISTANT

## 2020-05-31 PROCEDURE — 1123F ACP DISCUSS/DSCN MKR DOCD: CPT | Performed by: PHYSICIAN ASSISTANT

## 2020-05-31 PROCEDURE — 1090F PRES/ABSN URINE INCON ASSESS: CPT | Performed by: PHYSICIAN ASSISTANT

## 2020-05-31 ASSESSMENT — ENCOUNTER SYMPTOMS
RESPIRATORY NEGATIVE: 1
BOWEL INCONTINENCE: 0
VISUAL CHANGE: 0

## 2020-05-31 NOTE — PROGRESS NOTES
Subjective:      Patient ID: Usman Hagan is a 71 y.o. female. Fall   The accident occurred 1 to 3 hours ago. The fall occurred from a ladder. The point of impact was the right wrist. The pain is present in the right wrist. The symptoms are aggravated by movement. Pertinent negatives include no bowel incontinence, headaches, loss of consciousness or visual change. Review of Systems   Constitutional: Negative. HENT: Negative. Respiratory: Negative. Cardiovascular: Negative. Gastrointestinal: Negative for bowel incontinence. Neurological: Negative for loss of consciousness and headaches. Objective:   Physical Exam  HENT:      Head: Normocephalic. Right Ear: Tympanic membrane normal. No hemotympanum. Left Ear: Tympanic membrane normal. No hemotympanum. Eyes:      Pupils: Pupils are equal, round, and reactive to light. Cardiovascular:      Rate and Rhythm: Normal rate. Pulmonary:      Effort: Pulmonary effort is normal.      Breath sounds: Normal breath sounds. Musculoskeletal:      Right wrist: She exhibits tenderness and swelling. Skin:     Comments: Abrasion right forehead   Neurological:      General: No focal deficit present. Mental Status: She is alert and oriented to person, place, and time. Psychiatric:         Mood and Affect: Mood normal.         Xr Wrist Right (min 3 Views)    Result Date: 5/31/2020  EXAMINATION: XRAY VIEWS OF THE RIGHT WRIST 5/31/2020 1:58 pm COMPARISON: None. HISTORY: ORDERING SYSTEM PROVIDED HISTORY: Acute pain of right wrist TECHNOLOGIST PROVIDED HISTORY: acute pain R wrist due to fall Reason for Exam: [place] today, pain in whole right wrist Acuity: Acute Type of Exam: Initial FINDINGS: Comminuted fracture distal radius extending to the articular surface. Impaction at the fracture site. No other fracture. No dislocation. Degenerative changes 1st CMC joint.      Comminuted impacted fracture distal radius extending to the articular surface       Assessment:      1. Closed fracture of distal end of right radius, unspecified fracture morphology, initial encounter    2. Fall from ladder, initial encounter    3. Acute pain of right wrist    4. Facial laceration, initial encounter          Plan:      Xray results reviewed. Right short arm wrist splint with sling applied. Ice. Tylenol PRN pain. Supportive care. Orthopedic referral.  Local wound care to forehead laceration. Tetanus is current. Follow-up with PCP. Procedures    DJO ARM SLING WITH SWATHE     Patient was supplied a Simple Arm Sling. This retail item was supplied to provide functional support and assist in protecting the affected area. Verbal and written instructions for the use of and application of this item were provided. The patient was educated and fit by a healthcare professional with expert knowledge and specialization in brace application. They were instructed to contact the office immediately should the equipment result in increased pain, decreased sensation, increased swelling or worsening of the condition.     CO CAST SUP SHORT ARM SPLINT ADULT FIBERGLASS    CO APPLY FOREARM SPLINT,STATIC             ELIUD HUSAIN

## 2020-06-02 ENCOUNTER — OFFICE VISIT (OUTPATIENT)
Dept: ORTHOPEDIC SURGERY | Age: 65
End: 2020-06-02
Payer: MEDICARE

## 2020-06-02 VITALS
HEART RATE: 68 BPM | DIASTOLIC BLOOD PRESSURE: 74 MMHG | SYSTOLIC BLOOD PRESSURE: 124 MMHG | BODY MASS INDEX: 25.1 KG/M2 | HEIGHT: 64 IN | WEIGHT: 147 LBS

## 2020-06-02 PROCEDURE — 25600 CLTX DST RDL FX/EPHYS SEP WO: CPT | Performed by: PHYSICIAN ASSISTANT

## 2020-06-02 PROCEDURE — L3908 WHO COCK-UP NONMOLDE PRE OTS: HCPCS | Performed by: PHYSICIAN ASSISTANT

## 2020-06-02 PROCEDURE — 99213 OFFICE O/P EST LOW 20 MIN: CPT

## 2020-06-02 PROCEDURE — 99999 PR OFFICE/OUTPT VISIT,PROCEDURE ONLY: CPT | Performed by: PHYSICIAN ASSISTANT

## 2020-06-02 RX ORDER — COVID-19 ANTIGEN TEST
KIT MISCELLANEOUS PRN
COMMUNITY

## 2020-06-02 NOTE — PROGRESS NOTES
calcium-cholecalciferol (CALCIUM 500+D) 500-200 MG-UNIT per tablet Take 1 tablet by mouth daily      Ascorbic Acid (VITAMIN C) 500 MG tablet Take 500 mg by mouth daily      Multiple Vitamins-Minerals (MULTIVITAMIN PO) Take 1 capsule by mouth daily       No current facility-administered medications for this visit. Allergies:  Lipitor [atorvastatin]; Percocet [oxycodone-acetaminophen]; and Vicodin [hydrocodone-acetaminophen]    Social History:   Social History     Tobacco Use   Smoking Status Never Smoker   Smokeless Tobacco Never Used     Social History     Substance and Sexual Activity   Alcohol Use Yes    Alcohol/week: 0.0 standard drinks    Comment: occasionally     Social History     Substance and Sexual Activity   Drug Use No       Family History:  Family History   Problem Relation Age of Onset    Heart Disease Mother     High Blood Pressure Mother     High Cholesterol Mother     Thyroid Disease Mother     Other Sister         Graves Disease    Cancer Sister         thyroid cancer    Thyroid Disease Sister        REVIEW OF SYSTEMS:  Constitutional: Denies any fever, chills. Musculoskeletal: Positive for right wrist pain. PHYSICAL EXAM:  [unfilled]  General appearance:  Alert and oriented x 3. No apparent distress, appears stated age, calm and cooperative. Musculoskeletal: Right wrist was inspected skin is good repair minimal bruising is significant swelling in the wrist and hand however at this time compartments are all soft she had spontaneous finger flexion extension she is neurovascular intact the right hand. She does have pinpoint tenderness palpation over the distal radius here today. Daisy Vinson       DATA:  CBC:   Lab Results   Component Value Date    WBC 8.8 03/21/2020    HGB 14.7 03/21/2020     03/21/2020     BMP:    Lab Results   Component Value Date     03/21/2020    K 3.9 03/21/2020     03/21/2020    CO2 30 03/21/2020    BUN 13 03/21/2020    CREATININE 0.65 03/21/2020

## 2020-06-16 ENCOUNTER — OFFICE VISIT (OUTPATIENT)
Dept: ORTHOPEDIC SURGERY | Age: 65
End: 2020-06-16
Payer: MEDICARE

## 2020-06-16 ENCOUNTER — HOSPITAL ENCOUNTER (OUTPATIENT)
Dept: GENERAL RADIOLOGY | Age: 65
Discharge: HOME | End: 2020-06-18
Payer: MEDICARE

## 2020-06-16 VITALS
BODY MASS INDEX: 25.1 KG/M2 | HEIGHT: 64 IN | DIASTOLIC BLOOD PRESSURE: 84 MMHG | WEIGHT: 147 LBS | SYSTOLIC BLOOD PRESSURE: 130 MMHG

## 2020-06-16 PROCEDURE — 99024 POSTOP FOLLOW-UP VISIT: CPT | Performed by: PHYSICIAN ASSISTANT

## 2020-06-16 PROCEDURE — 73110 X-RAY EXAM OF WRIST: CPT

## 2020-06-16 PROCEDURE — 99213 OFFICE O/P EST LOW 20 MIN: CPT

## 2020-07-21 ENCOUNTER — HOSPITAL ENCOUNTER (OUTPATIENT)
Dept: GENERAL RADIOLOGY | Age: 65
Discharge: HOME | End: 2020-07-23
Payer: MEDICARE

## 2020-07-21 ENCOUNTER — OFFICE VISIT (OUTPATIENT)
Dept: ORTHOPEDIC SURGERY | Age: 65
End: 2020-07-21
Payer: MEDICARE

## 2020-07-21 VITALS
DIASTOLIC BLOOD PRESSURE: 84 MMHG | HEIGHT: 64 IN | SYSTOLIC BLOOD PRESSURE: 148 MMHG | HEART RATE: 74 BPM | WEIGHT: 147 LBS | BODY MASS INDEX: 25.1 KG/M2

## 2020-07-21 PROCEDURE — 99024 POSTOP FOLLOW-UP VISIT: CPT | Performed by: PHYSICIAN ASSISTANT

## 2020-07-21 PROCEDURE — 73110 X-RAY EXAM OF WRIST: CPT

## 2020-07-21 PROCEDURE — 99213 OFFICE O/P EST LOW 20 MIN: CPT

## 2020-07-21 NOTE — PROGRESS NOTES
Orthopaedic Progress Note      CHIEF COMPLAINT: Here today for follow-up of right distal radius fracture treated nonoperatively patient was treated in the splint. HISTORY OF PRESENT ILLNESS:       Ms. Darrell Long  is a 71 y.o. female  who presents with right distal radius fracture treated nonoperatively 7 weeks out. Patient is already well at this time no new complaints. Patient has been working out of the mWater-up wrist point on her own. Past Medical History:    Past Medical History:   Diagnosis Date    GERD (gastroesophageal reflux disease)     High cholesterol     Hormone replacement therapy     Postsurgical menopause 2003    Age 49       Past Surgical History:    Past Surgical History:   Procedure Laterality Date    CARPAL TUNNEL RELEASE Left 2018    Dr. Yu Maciel, right 12/2018    COLONOSCOPY  06/14    normal -10 yrs    CYSTOCELE REPAIR      INGUINAL HERNIA REPAIR Right 01/24/06    [place] REMOVAL OF BREAST LESION Right 10/3/2018    Excision Right Breast Cyst performed by Ziyad Dhillon MD at [address] [place] [address].   COLLAPSED CYST WITH MURAL ORGANIZING ACTIVE INFLAMMATION AND       ROTATOR CUFF REPAIR Right 2012    ROTATOR CUFF REPAIR Left 02/14    SERENITY AND BSO      TONSILLECTOMY      TUBAL LIGATION           Current  Medications:  Current Outpatient Medications   Medication Sig Dispense Refill    Naproxen Sodium (ALEVE) 220 MG CAPS Take by mouth as needed for Pain      simvastatin (ZOCOR) 20 MG tablet TAKE 1 TABLET NIGHTLY 90 tablet 3    omeprazole (PRILOSEC) 20 MG delayed release capsule Take 1 capsule by mouth daily 90 capsule 3    vitamin B-12 (CYANOCOBALAMIN) 1000 MCG tablet Take 1,000 mcg by mouth daily      Cholecalciferol (VITAMIN D3) 2000 units CAPS Take by mouth      calcium-cholecalciferol (CALCIUM 500+D) 500-200 MG-UNIT per tablet Take 1 tablet by mouth daily      Ascorbic Acid (VITAMIN C) 500 MG tablet Take 500 mg by mouth daily      Multiple Vitamins-Minerals (MULTIVITAMIN PO) Take 1 capsule by mouth daily       No current facility-administered medications for this visit. Allergies:  Lipitor [atorvastatin]; Percocet [oxycodone-acetaminophen]; and Vicodin [hydrocodone-acetaminophen]    Social History:   Social History     Tobacco Use   Smoking Status Never Smoker   Smokeless Tobacco Never Used     Social History     Substance and Sexual Activity   Alcohol Use Yes    Alcohol/week: 0.0 standard drinks    Comment: occasionally     Social History     Substance and Sexual Activity   Drug Use No       Family History:  Family History   Problem Relation Age of Onset    Heart Disease Mother     High Blood Pressure Mother     High Cholesterol Mother     Thyroid Disease Mother     Other Sister         Graves Disease    Cancer Sister         thyroid cancer    Thyroid Disease Sister        REVIEW OF SYSTEMS:  Constitutional: Denies any fever, chills. Musculoskeletal: Positive for right distal radius fracture. PHYSICAL EXAM:  [unfilled]  General appearance:  Alert and oriented x 3. No apparent distress, appears stated age, calm and cooperative. Musculoskeletal: Does have pain with range of motion pain to palpation about this right wrist.. DATA:  CBC:   Lab Results   Component Value Date    WBC 8.8 03/21/2020    HGB 14.7 03/21/2020     03/21/2020     BMP:    Lab Results   Component Value Date     03/21/2020    K 3.9 03/21/2020     03/21/2020    CO2 30 03/21/2020    BUN 13 03/21/2020    CREATININE 0.65 03/21/2020    CREATININE 0.6 06/06/2018    CALCIUM 10.2 03/21/2020    GLUCOSE 90 03/21/2020     PT/INR:  No results found for: PROTIME, INR  Troponin:  No results found for: TROPONINI  No results for input(s): LIPASE, AMYLASE in the last 72 hours. No results for input(s): AST, ALT, BILIDIR, BILITOT, ALKPHOS in the last 72 hours. Uric Acid:  No components found for: URIC  Urine Culture:  No components found for: CURINE    Radiology:   No results found.     X-rays personally reviewed by me of healing right distal radius fracture noted on 3 views       Diagnosis Orders   1. Closed fracture of distal end of right radius with routine healing, unspecified fracture morphology, subsequent encounter           PLAN:  Continue with the splint over the next week and begin working out of the splint patient may begin weightbearing as tolerated over the next month if she still having issues or complaints we will have her come back and see if we can discuss getting her set up with physical therapy if she does come back and see if we will repeat x-rays right wrist    No orders of the defined types were placed in this encounter.        Procedure:        Electronically signed by Steve Sharp PA-C on 7/21/2020 at 9:13 AM

## 2020-09-01 ENCOUNTER — TELEPHONE (OUTPATIENT)
Dept: OBGYN | Age: 65
End: 2020-09-01

## 2020-09-01 ENCOUNTER — HOSPITAL ENCOUNTER (OUTPATIENT)
Dept: MAMMOGRAPHY | Age: 65
Discharge: HOME | End: 2020-09-03
Payer: MEDICARE

## 2020-09-01 ENCOUNTER — HOSPITAL ENCOUNTER (OUTPATIENT)
Age: 65
Setting detail: SPECIMEN
Discharge: HOME | End: 2020-09-01
Payer: MEDICARE

## 2020-09-01 ENCOUNTER — OFFICE VISIT (OUTPATIENT)
Dept: OBGYN | Age: 65
End: 2020-09-01
Payer: MEDICARE

## 2020-09-01 VITALS
WEIGHT: 143 LBS | BODY MASS INDEX: 25.34 KG/M2 | HEART RATE: 60 BPM | DIASTOLIC BLOOD PRESSURE: 74 MMHG | TEMPERATURE: 96.4 F | SYSTOLIC BLOOD PRESSURE: 116 MMHG | HEIGHT: 63 IN

## 2020-09-01 LAB
-: ABNORMAL
AMORPHOUS: ABNORMAL
BACTERIA: ABNORMAL
BILIRUBIN URINE: NEGATIVE
CASTS UA: ABNORMAL /LPF (ref 0–2)
COLOR: ABNORMAL
COMMENT UA: ABNORMAL
CRYSTALS, UA: ABNORMAL /HPF
EPITHELIAL CELLS UA: ABNORMAL /HPF (ref 0–5)
GLUCOSE URINE: NEGATIVE
KETONES, URINE: NEGATIVE
LEUKOCYTE ESTERASE, URINE: NEGATIVE
MUCUS: ABNORMAL
NITRITE, URINE: NEGATIVE
OTHER OBSERVATIONS UA: ABNORMAL
PH UA: 6 (ref 5–6)
PROTEIN UA: NEGATIVE
RBC UA: ABNORMAL /HPF (ref 0–4)
RENAL EPITHELIAL, UA: ABNORMAL /HPF
SPECIFIC GRAVITY UA: 1.01 (ref 1.01–1.02)
TRICHOMONAS: ABNORMAL
TURBIDITY: ABNORMAL
URINE HGB: ABNORMAL
UROBILINOGEN, URINE: NORMAL
WBC UA: ABNORMAL /HPF (ref 0–4)
YEAST: ABNORMAL

## 2020-09-01 PROCEDURE — G8417 CALC BMI ABV UP PARAM F/U: HCPCS | Performed by: NURSE PRACTITIONER

## 2020-09-01 PROCEDURE — 4040F PNEUMOC VAC/ADMIN/RCVD: CPT | Performed by: NURSE PRACTITIONER

## 2020-09-01 PROCEDURE — 77063 BREAST TOMOSYNTHESIS BI: CPT

## 2020-09-01 PROCEDURE — 1036F TOBACCO NON-USER: CPT | Performed by: NURSE PRACTITIONER

## 2020-09-01 PROCEDURE — 3017F COLORECTAL CA SCREEN DOC REV: CPT | Performed by: NURSE PRACTITIONER

## 2020-09-01 PROCEDURE — 81001 URINALYSIS AUTO W/SCOPE: CPT

## 2020-09-01 PROCEDURE — G8427 DOCREV CUR MEDS BY ELIG CLIN: HCPCS | Performed by: NURSE PRACTITIONER

## 2020-09-01 PROCEDURE — P3000 SCREEN PAP BY TECH W MD SUPV: HCPCS

## 2020-09-01 PROCEDURE — 1123F ACP DISCUSS/DSCN MKR DOCD: CPT | Performed by: NURSE PRACTITIONER

## 2020-09-01 PROCEDURE — 87086 URINE CULTURE/COLONY COUNT: CPT

## 2020-09-01 PROCEDURE — 99214 OFFICE O/P EST MOD 30 MIN: CPT

## 2020-09-01 PROCEDURE — G0101 CA SCREEN;PELVIC/BREAST EXAM: HCPCS | Performed by: NURSE PRACTITIONER

## 2020-09-01 PROCEDURE — G8400 PT W/DXA NO RESULTS DOC: HCPCS | Performed by: NURSE PRACTITIONER

## 2020-09-01 PROCEDURE — 99214 OFFICE O/P EST MOD 30 MIN: CPT | Performed by: NURSE PRACTITIONER

## 2020-09-01 PROCEDURE — 1090F PRES/ABSN URINE INCON ASSESS: CPT | Performed by: NURSE PRACTITIONER

## 2020-09-01 RX ORDER — CLOBETASOL PROPIONATE 0.5 MG/G
CREAM TOPICAL
Qty: 60 G | Refills: 2 | Status: SHIPPED | OUTPATIENT
Start: 2020-09-01 | End: 2021-03-31 | Stop reason: ALTCHOICE

## 2020-09-01 NOTE — PROGRESS NOTES
Adrianne Pacheco  2020              76 y.o. Chief Complaint   Patient presents with    Gynecologic Exam     back pain started last night         No LMP recorded (lmp unknown). Patient has had a hysterectomy. No referring provider defined for this encounter. HPI :Annual Exam  Patient presents for annual exam.  Counseling on healthy lifestyle reviewed, as well as the need for self breast exam. We reviewed the need for Kegal exercises. We discussed and reviewed the need for routine screenings and immunization updates when appropriate. Discussed bone health. Reviewed adequate calcium and vitamin D requirements with emphasis on dietary calcium as opposed to large quantities of supplements. Discussed osteoporosis prevention, fall prevention, fracture risks, weight bearing exercise and upper body strengthening. Performs monthly self breast exams. Low back discomfort since last night. Urinating more frequently. No dysuria or hematuria. Does have occasional mixed urinary incontinence, but that is not a new symptom. U/A today is clear. Urine culture is pending    The patient is sexually active. last pap: was normal, last mammogram: was normal  The patient has regular exercise: yes .  We did review the need for and frequency of both weight bearing and strengthening as tolerated by the patient. ________________________________________________________________________  OB History    Para Term  AB Living   3 3 3 0 0 3   SAB TAB Ectopic Molar Multiple Live Births   0 0 0 0 0 0      # Outcome Date GA Lbr Bubba/2nd Weight Sex Delivery Anes PTL Lv   3 Term     F Vag-Spont      2 Term     F Vag-Spont      1 Term     M Vag-Spont        Past Medical History:   Diagnosis Date    GERD (gastroesophageal reflux disease)     High cholesterol     Hormone replacement therapy     Postsurgical menopause 2003    Age 49                                                                   Past Surgical History:   Procedure Laterality Date    CARPAL TUNNEL RELEASE Left 2018    Dr. Angela Becerra, right 12/2018    COLONOSCOPY  06/14    normal -10 yrs    CYSTOCELE REPAIR      INGUINAL HERNIA REPAIR Right 01/24/06    NE REMOVAL OF BREAST LESION Right 10/3/2018    Excision Right Breast Cyst performed by Krystian Boone MD at [address] [place] [address]. COLLAPSED CYST WITH MURAL ORGANIZING ACTIVE INFLAMMATION AND       ROTATOR CUFF REPAIR Right 2012    ROTATOR CUFF REPAIR Left 02/14    SERENITY AND BSO      TONSILLECTOMY      TUBAL LIGATION       Family History   Problem Relation Age of Onset    Heart Disease Mother     High Blood Pressure Mother     High Cholesterol Mother     Thyroid Disease Mother     Other Mother         ascending aortic disection    Other Sister         Graves Disease    Cancer Sister         thyroid cancer    Thyroid Disease Sister      Social History     Socioeconomic History    Marital status:      Spouse name: Not on file    Number of children: Not on file    Years of education: Not on file    Highest education level: Not on file   Occupational History    Not on file   Social Needs    Financial resource strain: Not on file    Food insecurity     Worry: Not on file     Inability: Not on file    Transportation needs     Medical: Not on file     Non-medical: Not on file   Tobacco Use    Smoking status: Never Smoker    Smokeless tobacco: Never Used   Substance and Sexual Activity    Alcohol use:  Yes     Alcohol/week: 0.0 standard drinks     Comment: occasionally    Drug use: No    Sexual activity: Yes     Partners: Male   Lifestyle    Physical activity     Days per week: Not on file     Minutes per session: Not on file    Stress: Not on file   Relationships    Social connections     Talks on phone: Not on file     Gets together: Not on file     Attends Anabaptism service: Not on file     Active member of club or organization: Not on file     Attends meetings of clubs or organizations: Not on file     Relationship status: Not on file    Intimate partner violence     Fear of current or ex partner: Not on file     Emotionally abused: Not on file     Physically abused: Not on file     Forced sexual activity: Not on file   Other Topics Concern    Not on file   Social History Narrative    Not on file       MEDICATIONS:  Current Outpatient Medications   Medication Sig Dispense Refill    clobetasol (TEMOVATE) 0.05 % cream Apply topically 2 times daily for one week, daily for 2 weeks, every other day for 2 weeks, then twice weekly 60 g 2    Naproxen Sodium (ALEVE) 220 MG CAPS Take by mouth as needed for Pain      simvastatin (ZOCOR) 20 MG tablet TAKE 1 TABLET NIGHTLY 90 tablet 3    omeprazole (PRILOSEC) 20 MG delayed release capsule Take 1 capsule by mouth daily 90 capsule 3    vitamin B-12 (CYANOCOBALAMIN) 1000 MCG tablet Take 1,000 mcg by mouth daily      Cholecalciferol (VITAMIN D3) 2000 units CAPS Take by mouth      calcium-cholecalciferol (CALCIUM 500+D) 500-200 MG-UNIT per tablet Take 1 tablet by mouth daily      Multiple Vitamins-Minerals (MULTIVITAMIN PO) Take 1 capsule by mouth daily      Ascorbic Acid (VITAMIN C) 500 MG tablet Take 500 mg by mouth daily       No current facility-administered medications for this visit. ALLERGIES:  Allergies as of 09/01/2020 - Review Complete 09/01/2020   Allergen Reaction Noted    Lipitor [atorvastatin] Other (See Comments) 07/27/2015    Percocet [oxycodone-acetaminophen] Nausea Only 11/07/2013    Vicodin [hydrocodone-acetaminophen] Nausea Only 11/07/2013     Gynecologic History:  Menarche: 10   Menopause at hysterectomy   No LMP recorded (lmp unknown). Patient has had a hysterectomy.   Hormone Exposure: No    Family History of Breast, Ovarian , Colon or Uterine Cancer: No     Preventative Health Testing:  Date of Last Pap Smear: 2019  Date of Last Mammogram: 2019  Date of Last Colonoscopy: 2014  Date of Last Bone Density: 2019  ________________________________________________________________________      Review Of Systems:  General ROS:  negative  Hematological and Lymphatic ROS:negative   Breast ROS: negative  Cardiovascular ROS: negative  Respiratory ROS: negative   Gastrointestinal ROS: negative  Genito-Urinary ROS: positive for bladder problem  Psychological ROS: negative  Neurological ROS: negative  Musculoskeletal ROS: negative  Dermatological ROS: negative                                                                                                                                                                                   PHYSICAL Exam:     Constitutional:  Blood pressure 116/74, pulse 60, temperature 96.4 °F (35.8 °C), temperature source Temporal, height 5' 2.5\" (1.588 m), weight 143 lb (64.9 kg), not currently breastfeeding. General Appearance: This  is a well Developed, well Nourished, well groomed female. Her BMI was reviewed. Skin:  There was a Normal Inspection of the skin without rashes or lesions. There were no rashes. (Papular, Maculopapular, Hives, Pustular, Macular)     There were no lesions (Ulcers, Erythema, Abn. Appearing Nevi)      Lymphatic:  No Lymph Nodes were Palpable in the neck , axilla or groin. Neck and EENT:  The neck was supple. There were no masses   The thyroid was not enlarged and had no masses. PERRLA, Nares Patent No Masses    Respiratory: The lungs were auscultated and found to be clear. There were no rales, rhonchi or wheezes. There was a good respiratory effort. Cardiovascular: The heart was in a regular rate and rhythm. . No S3 or S4. There was no murmur appreciated. Extremities: The patients extremities were without calf tenderness, edema, or varicosities. There was full range of motion in all four extremities. Pulses in all four extremities    Abdomen: The abdomen was soft and non-tender.  There were good bowel sounds in all quadrants and there was no guarding, rebound or rigidity. On evaluation there was no evidence of hepatosplenomegaly and there was no costal vertebral chepe tenderness bilaterally. No hernias were appreciated. Psych: The patient had a normal Orientation to: Time, Place, Person, and Situation  Mood and affect appropriate    Breast:  (Chest)  normal appearance, no masses or tenderness, Inspection negative, No nipple retraction or dimpling, No nipple discharge or bleeding, No axillary or supraclavicular adenopathy, Normal to palpation without dominant masses      Pelvic Exam:  External genitalia: decreased pigmentation and superficial fissure consistent with lichen sclerosus  Urinary system: urethral meatus normal  Vaginal: atrophic mucosa  Cervix: absent and removed surgically  Adnexa: normal bimanual exam and non palpable  Uterus: absent and removed surgically  Conventional pap obtained from vaginal apex    Musculosk:  Normal Gait and station was noted. Digits were evaluated without abnormal findings. Range of motion, stability and strength were evaluated and found to be appropriate for the patients age. ASSESSMENT:      76 y.o. Annual   Diagnosis Orders   1. Menopausal state     2. Other screening mammogram  KAVON THERESE DIGITAL SCREEN BILATERAL   3. Screening for vaginal cancer  PAP SMEAR   4. Well female exam with routine gynecological exam  IL CA SCREEN;PELVIC/BREAST EXAM    IL OBTAINING SCREEN PAP SMEAR   5. Polyuria  Urinalysis with Microscopic    Culture, Urine   6.  Lichen sclerosus et atrophicus          Chief Complaint   Patient presents with    Gynecologic Exam     back pain started last night         Past Medical History:   Diagnosis Date    GERD (gastroesophageal reflux disease)     High cholesterol     Hormone replacement therapy     Postsurgical menopause 2003    Age 49         Patient Active Problem List   Diagnosis    GERD (gastroesophageal reflux disease)    Hormone replacement therapy          Hereditary Breast, Ovarian, Colon and Uterine Cancer screening Done. Tobacco & Secondary smoke risks reviewed; instructed on cessation and avoidance    PLAN:  Return in about 6 weeks (around 10/13/2020) for Recheck LSA. Return for annual exams  Mammograms every 1 year. If 36 yo and last mammogram was negative. Routine health maintenance per patients PCP. Orders Placed This Encounter   Procedures    Culture, Urine     Standing Status:   Future     Number of Occurrences:   1     Standing Expiration Date:   10/1/2020     Order Specific Question:   Specify (ex-cath, midstream, cysto, etc)? Answer:   midstream    KAVON THERESE DIGITAL SCREEN BILATERAL     Standing Status:   Future     Standing Expiration Date:   3/1/2022     Order Specific Question:   Reason for exam:     Answer:   Screening    PAP SMEAR     Standing Status:   Future     Number of Occurrences:   1     Standing Expiration Date:   11/1/2020     Order Specific Question:   Collection Type     Answer:   Conventional     Order Specific Question:   Prior Abnormal Pap Test     Answer:   No     Order Specific Question:   Screening or Diagnostic     Answer:   Screening     Order Specific Question:   HPV Requested? Answer:   Yes - If Abnormal Reflex HPV     Order Specific Question:   High Risk Patient     Answer:   N/A    Urinalysis with Microscopic     Standing Status:   Future     Number of Occurrences:   1     Standing Expiration Date:   1/1/2021     Order Specific Question:   SPECIFY(EX-CATH,MIDSTREAM,CYSTO,ETC)? Answer:   midstream    MA CA SCREEN;PELVIC/BREAST EXAM    MA OBTAINING SCREEN PAP SMEAR     25 minutes spent face to face, 80% in counseling, education, and coordination of care.       Amilcar Morillo  9/1/2020

## 2020-09-01 NOTE — PATIENT INSTRUCTIONS
Patient Education        Kegel Exercises: Care Instructions  Your Care Instructions     Kegel exercises strengthen muscles around the bladder. These muscles control the flow of urine. Kegel exercises are sometime called \"pelvic floor\" exercises. They can help prevent urine leakage and keep the pelvic organs in place. A woman who just had a baby might want to try Kegel exercises. They can strengthen pelvic muscles that have been weakened by pregnancy and childbirth. A man or woman may use Kegel exercises to treat urine leakage. You do Kegel exercises by tightening the muscles you use when you urinate. You will likely need to do these exercises for several weeks to get better. Follow-up care is a key part of your treatment and safety. Be sure to make and go to all appointments, and call your doctor if you are having problems. It's also a good idea to know your test results and keep a list of the medicines you take. How can you care for yourself at home? · Do Kegel exercises. ? Find the muscles you need to strengthen. To do this, tighten the muscles that stop your urine while you are going to the bathroom. These are the same muscles you squeeze during Kegel exercises. ? Squeeze the muscles as hard as you can. Your belly and thighs should not move. ? Hold the squeeze for 3 seconds. Then relax for 3 seconds. ? Start with 3 seconds, and then add 1 second each week until you are able to squeeze for 10 seconds. ? Repeat the exercise 10 to 15 times for each session. Do three or more sessions each day. · You can check to see if you are using the right muscles. Place a finger in your vagina and squeeze around it. You are doing them right when you feel pressure around your finger. Your doctor may also suggest that you put special weights in your vagina while you do the exercises. · Do not smoke. It can irritate the bladder. If you need help quitting, talk to your doctor about stop-smoking programs and medicines.

## 2020-09-02 LAB
CULTURE: NORMAL
Lab: NORMAL
SPECIMEN DESCRIPTION: NORMAL

## 2020-09-03 NOTE — TELEPHONE ENCOUNTER
Yes.  But please clarify because prescription was ordered as clobetasol which is the generic of Temovate

## 2020-09-09 LAB — CYTOLOGY REPORT: NORMAL

## 2020-09-26 ENCOUNTER — HOSPITAL ENCOUNTER (OUTPATIENT)
Dept: LAB | Age: 65
Discharge: HOME | End: 2020-09-26
Payer: MEDICARE

## 2020-09-26 LAB
ABSOLUTE EOS #: 0.22 K/UL (ref 0–0.44)
ABSOLUTE IMMATURE GRANULOCYTE: <0.03 K/UL (ref 0–0.3)
ABSOLUTE LYMPH #: 2.94 K/UL (ref 1.1–3.7)
ABSOLUTE MONO #: 0.57 K/UL (ref 0.1–1.2)
ALBUMIN SERPL-MCNC: 4.4 G/DL (ref 3.5–5.2)
ALBUMIN/GLOBULIN RATIO: 1.7 (ref 1–2.5)
ALP BLD-CCNC: 104 U/L (ref 35–104)
ALT SERPL-CCNC: 18 U/L (ref 5–33)
ANION GAP SERPL CALCULATED.3IONS-SCNC: 10 MMOL/L (ref 9–17)
AST SERPL-CCNC: 18 U/L
BASOPHILS # BLD: 1 % (ref 0–2)
BASOPHILS ABSOLUTE: 0.04 K/UL (ref 0–0.2)
BILIRUB SERPL-MCNC: 0.5 MG/DL (ref 0.3–1.2)
BUN BLDV-MCNC: 16 MG/DL (ref 8–23)
BUN/CREAT BLD: 23 (ref 9–20)
CALCIUM SERPL-MCNC: 10.2 MG/DL (ref 8.6–10.4)
CHLORIDE BLD-SCNC: 106 MMOL/L (ref 98–107)
CHOLESTEROL/HDL RATIO: 3.9
CHOLESTEROL: 219 MG/DL
CO2: 27 MMOL/L (ref 20–31)
CREAT SERPL-MCNC: 0.7 MG/DL (ref 0.5–0.9)
DIFFERENTIAL TYPE: NORMAL
EOSINOPHILS RELATIVE PERCENT: 3 % (ref 1–4)
GFR AFRICAN AMERICAN: >60 ML/MIN
GFR NON-AFRICAN AMERICAN: >60 ML/MIN
GFR SERPL CREATININE-BSD FRML MDRD: ABNORMAL ML/MIN/{1.73_M2}
GFR SERPL CREATININE-BSD FRML MDRD: ABNORMAL ML/MIN/{1.73_M2}
GLUCOSE BLD-MCNC: 91 MG/DL (ref 70–99)
HCT VFR BLD CALC: 44 % (ref 36.3–47.1)
HDLC SERPL-MCNC: 56 MG/DL
HEMOGLOBIN: 14 G/DL (ref 11.9–15.1)
IMMATURE GRANULOCYTES: 0 %
LDL CHOLESTEROL: 136 MG/DL (ref 0–130)
LYMPHOCYTES # BLD: 43 % (ref 24–43)
MCH RBC QN AUTO: 29.5 PG (ref 25.2–33.5)
MCHC RBC AUTO-ENTMCNC: 31.8 G/DL (ref 25.2–33.5)
MCV RBC AUTO: 92.6 FL (ref 82.6–102.9)
MONOCYTES # BLD: 8 % (ref 3–12)
NRBC AUTOMATED: 0 PER 100 WBC
PDW BLD-RTO: 12.9 % (ref 11.8–14.4)
PLATELET # BLD: 240 K/UL (ref 138–453)
PLATELET ESTIMATE: NORMAL
PMV BLD AUTO: 9.6 FL (ref 8.1–13.5)
POTASSIUM SERPL-SCNC: 4 MMOL/L (ref 3.7–5.3)
RBC # BLD: 4.75 M/UL (ref 3.95–5.11)
RBC # BLD: NORMAL 10*6/UL
SEG NEUTROPHILS: 45 % (ref 36–65)
SEGMENTED NEUTROPHILS ABSOLUTE COUNT: 3.06 K/UL (ref 1.5–8.1)
SODIUM BLD-SCNC: 143 MMOL/L (ref 135–144)
THYROXINE, FREE: 0.95 NG/DL (ref 0.93–1.7)
TOTAL PROTEIN: 7 G/DL (ref 6.4–8.3)
TRIGL SERPL-MCNC: 134 MG/DL
TSH SERPL DL<=0.05 MIU/L-ACNC: 4.07 MIU/L (ref 0.3–5)
VLDLC SERPL CALC-MCNC: ABNORMAL MG/DL (ref 1–30)
WBC # BLD: 6.8 K/UL (ref 3.5–11.3)
WBC # BLD: NORMAL 10*3/UL

## 2020-09-26 PROCEDURE — 36415 COLL VENOUS BLD VENIPUNCTURE: CPT

## 2020-09-26 PROCEDURE — 84443 ASSAY THYROID STIM HORMONE: CPT

## 2020-09-26 PROCEDURE — 80061 LIPID PANEL: CPT

## 2020-09-26 PROCEDURE — 85025 COMPLETE CBC W/AUTO DIFF WBC: CPT

## 2020-09-26 PROCEDURE — 84439 ASSAY OF FREE THYROXINE: CPT

## 2020-09-26 PROCEDURE — 80053 COMPREHEN METABOLIC PANEL: CPT

## 2020-09-28 ENCOUNTER — OFFICE VISIT (OUTPATIENT)
Dept: FAMILY MEDICINE CLINIC | Age: 65
End: 2020-09-28
Payer: MEDICARE

## 2020-09-28 VITALS
DIASTOLIC BLOOD PRESSURE: 68 MMHG | WEIGHT: 135 LBS | BODY MASS INDEX: 23.05 KG/M2 | HEIGHT: 64 IN | TEMPERATURE: 97.9 F | SYSTOLIC BLOOD PRESSURE: 115 MMHG | OXYGEN SATURATION: 97 % | HEART RATE: 64 BPM

## 2020-09-28 PROCEDURE — G8427 DOCREV CUR MEDS BY ELIG CLIN: HCPCS | Performed by: FAMILY MEDICINE

## 2020-09-28 PROCEDURE — 99212 OFFICE O/P EST SF 10 MIN: CPT

## 2020-09-28 PROCEDURE — 3017F COLORECTAL CA SCREEN DOC REV: CPT | Performed by: FAMILY MEDICINE

## 2020-09-28 PROCEDURE — G8420 CALC BMI NORM PARAMETERS: HCPCS | Performed by: FAMILY MEDICINE

## 2020-09-28 PROCEDURE — G8400 PT W/DXA NO RESULTS DOC: HCPCS | Performed by: FAMILY MEDICINE

## 2020-09-28 PROCEDURE — 1123F ACP DISCUSS/DSCN MKR DOCD: CPT | Performed by: FAMILY MEDICINE

## 2020-09-28 PROCEDURE — 1036F TOBACCO NON-USER: CPT | Performed by: FAMILY MEDICINE

## 2020-09-28 PROCEDURE — 99214 OFFICE O/P EST MOD 30 MIN: CPT | Performed by: FAMILY MEDICINE

## 2020-09-28 PROCEDURE — 1090F PRES/ABSN URINE INCON ASSESS: CPT | Performed by: FAMILY MEDICINE

## 2020-09-28 PROCEDURE — 4040F PNEUMOC VAC/ADMIN/RCVD: CPT | Performed by: FAMILY MEDICINE

## 2020-09-28 RX ORDER — SIMVASTATIN 40 MG
TABLET ORAL
Qty: 30 TABLET | Refills: 5 | Status: SHIPPED | OUTPATIENT
Start: 2020-09-28 | End: 2021-03-31 | Stop reason: SDUPTHER

## 2020-09-28 ASSESSMENT — ENCOUNTER SYMPTOMS
ALLERGIC/IMMUNOLOGIC NEGATIVE: 1
EYES NEGATIVE: 1
RESPIRATORY NEGATIVE: 1
BACK PAIN: 0
GASTROINTESTINAL NEGATIVE: 1

## 2020-09-28 NOTE — PROGRESS NOTES
Subjective:      Patient ID: Jaleesa Méndez is a 76 y.o. female. Hand Pain      Hyperlipidemia     Gastroesophageal Reflux         Routine follow up on chronic medical conditions, refills, and review of updated labs. I have reviewed the patient's medical history in detail and updated the computerized patient record. ultrasound views of the right breast revealed a complicated cyst.  No solid mass. She  Had excisional biopsy of the mass 10/3/18, benign pathology per patient report. Rare gerd symptoms. She relates symptoms after forgetting her omeprazole one day. Nothing persistent. Feeling well overall. Some joint pain in the fingers/hands. She notices making her bed in the morning harder. Fingers seem stiff. Harder to nicola. Recent chest wall discomfort in the bilateral, upper chest anterior walls. Comes and goes. No sob. No cough, no dyspnea. Fairly brief, a few minutes, random.         Past Medical History:   Diagnosis Date    GERD (gastroesophageal reflux disease)     High cholesterol     Hormone replacement therapy     Postsurgical menopause 2003    Age 49     Current Outpatient Medications   Medication Sig Dispense Refill    clobetasol (TEMOVATE) 0.05 % cream Apply topically 2 times daily for one week, daily for 2 weeks, every other day for 2 weeks, then twice weekly 60 g 2    Naproxen Sodium (ALEVE) 220 MG CAPS Take by mouth as needed for Pain      simvastatin (ZOCOR) 20 MG tablet TAKE 1 TABLET NIGHTLY 90 tablet 3    omeprazole (PRILOSEC) 20 MG delayed release capsule Take 1 capsule by mouth daily 90 capsule 3    vitamin B-12 (CYANOCOBALAMIN) 1000 MCG tablet Take 1,000 mcg by mouth daily      Cholecalciferol (VITAMIN D3) 2000 units CAPS Take by mouth      Multiple Vitamins-Minerals (MULTIVITAMIN PO) Take 1 capsule by mouth daily      calcium-cholecalciferol (CALCIUM 500+D) 500-200 MG-UNIT per tablet Take 1 tablet by mouth daily      Ascorbic Acid (VITAMIN C) 500 MG tablet Take 500 mg by mouth daily       No current facility-administered medications for this visit. Allergies   Allergen Reactions    Lipitor [Atorvastatin] Other (See Comments)     Myalgias        Percocet [Oxycodone-Acetaminophen] Nausea Only    Vicodin [Hydrocodone-Acetaminophen] Nausea Only       Review of Systems   Constitutional: Negative. HENT: Negative. Eyes: Negative. Respiratory: Negative. Cardiovascular: Negative. Gastrointestinal: Negative. Genitourinary: Negative. Musculoskeletal: Negative. Negative for arthralgias and back pain. Skin: Negative. Allergic/Immunologic: Negative. Neurological: Negative. Hematological: Negative. Psychiatric/Behavioral: Negative. Objective:   Physical Exam  Constitutional:       General: She is not in acute distress. Appearance: She is well-developed. HENT:      Head: Normocephalic and atraumatic. Right Ear: External ear normal.      Left Ear: External ear normal.      Nose: Nose normal.      Mouth/Throat:      Pharynx: No oropharyngeal exudate. Eyes:      Conjunctiva/sclera: Conjunctivae normal.   Neck:      Musculoskeletal: Neck supple. Thyroid: No thyromegaly. Cardiovascular:      Rate and Rhythm: Normal rate and regular rhythm. Heart sounds: Normal heart sounds. No murmur. Pulmonary:      Effort: Pulmonary effort is normal. No respiratory distress. Breath sounds: No wheezing. Abdominal:      General: Bowel sounds are normal. There is no distension. Palpations: Abdomen is soft. Tenderness: There is no abdominal tenderness. Musculoskeletal:         General: No tenderness. Right hand: She exhibits deformity (slight enlargement of right index mcp, otherwise no significant deformity or inflammatory changes). Lymphadenopathy:      Cervical: No cervical adenopathy. Skin:     General: Skin is warm and dry. Findings: No erythema or rash.    Neurological:      Mental Status: She is alert and oriented to person, place, and time. Psychiatric:         Behavior: Behavior normal.         Thought Content:  Thought content normal.         Judgment: Judgment normal.       /68 (Site: Left Upper Arm, Position: Sitting, Cuff Size: Large Adult)   Pulse 64   Temp 97.9 °F (36.6 °C) (Temporal)   Ht 5' 4\" (1.626 m)   Wt 135 lb (61.2 kg)   LMP  (LMP Unknown)   SpO2 97%   BMI 23.17 kg/m²   Results for orders placed or performed during the hospital encounter of 09/26/20   CBC Auto Differential   Result Value Ref Range    WBC 6.8 3.5 - 11.3 k/uL    RBC 4.75 3.95 - 5.11 m/uL    Hemoglobin 14.0 11.9 - 15.1 g/dL    Hematocrit 44.0 36.3 - 47.1 %    MCV 92.6 82.6 - 102.9 fL    MCH 29.5 25.2 - 33.5 pg    MCHC 31.8 25.2 - 33.5 g/dL    RDW 12.9 11.8 - 14.4 %    Platelets 319 761 - 206 k/uL    MPV 9.6 8.1 - 13.5 fL    NRBC Automated 0.0 0.0 per 100 WBC    Differential Type NOT REPORTED     Seg Neutrophils 45 36 - 65 %    Lymphocytes 43 24 - 43 %    Monocytes 8 3 - 12 %    Eosinophils % 3 1 - 4 %    Basophils 1 0 - 2 %    Immature Granulocytes 0 0 %    Segs Absolute 3.06 1.50 - 8.10 k/uL    Absolute Lymph # 2.94 1.10 - 3.70 k/uL    Absolute Mono # 0.57 0.10 - 1.20 k/uL    Absolute Eos # 0.22 0.00 - 0.44 k/uL    Basophils Absolute 0.04 0.00 - 0.20 k/uL    Absolute Immature Granulocyte <0.03 0.00 - 0.30 k/uL    WBC Morphology NOT REPORTED     RBC Morphology NOT REPORTED     Platelet Estimate NOT REPORTED    Comprehensive Metabolic Panel   Result Value Ref Range    Glucose 91 70 - 99 mg/dL    BUN 16 8 - 23 mg/dL    CREATININE 0.70 0.50 - 0.90 mg/dL    Bun/Cre Ratio 23 (H) 9 - 20    Calcium 10.2 8.6 - 10.4 mg/dL    Sodium 143 135 - 144 mmol/L    Potassium 4.0 3.7 - 5.3 mmol/L    Chloride 106 98 - 107 mmol/L    CO2 27 20 - 31 mmol/L    Anion Gap 10 9 - 17 mmol/L    Alkaline Phosphatase 104 35 - 104 U/L    ALT 18 5 - 33 U/L    AST 18 <32 U/L    Total Bilirubin 0.50 0.3 - 1.2 mg/dL    Total Protein 7.0 6.4 - 8.3 g/dL    Alb 4.4 3.5 - 5.2 g/dL    Albumin/Globulin Ratio 1.7 1.0 - 2.5    GFR Non-African American >60 >60 mL/min    GFR African American >60 >60 mL/min    GFR Comment          GFR Staging NOT REPORTED    Lipid Panel   Result Value Ref Range    Cholesterol 219 (H) <200 mg/dL    HDL 56 >40 mg/dL    LDL Cholesterol 136 (H) 0 - 130 mg/dL    Chol/HDL Ratio 3.9 <5    Triglycerides 134 <150 mg/dL    VLDL NOT REPORTED 1 - 30 mg/dL   T4, Free   Result Value Ref Range    Thyroxine, Free 0.95 0.93 - 1.70 ng/dL   TSH without Reflex   Result Value Ref Range    TSH 4.07 0.30 - 5.00 mIU/L       Assessment:       Encounter Diagnoses   Name Primary?  Gastroesophageal reflux disease without esophagitis Yes    Hormone replacement therapy     Pure hypercholesterolemia     Venous insufficiency     TSH elevation     Primary osteoarthritis of both hands              Plan:      gerd: quiescent at present on daily omeprazole. Recurrent symptoms with taper at qod dosing, so she went back to daily dosing. Cont. Same. HRT: followed by [facility]. S/p Dipak and bso. Stopped after breast lump issue. Off hrt at present. Pap/pelvic/mammograms up to date. .  Some concerns for a complex cyst in the lower right breast.   excisional biopsy due to complex appearance of the cyst completed 10/3/19  BREAST TISSUES, EXCISION (RIGHT):       - COLLAPSED CYST WITH MURAL ORGANIZING ACTIVE INFLAMMATION AND       FIBROSIS.     - SMALL DUCTAL PAPILLOMA.       - RARE FOCI OF ATYPICAL DUCTAL HYPERPLASIA. DEXA scan normal 12/11/18. Cont. Calcium and vitamin D     Hyperlipidemia: improved/stable , on simvastatin .  ldl at 136. Asking for her to increase dose to 40mg/day. Mild venous insufficiency by report. Variable onset, not daily at present. Discussed management options. Observing at present. Improved at present.       Mild tsh elevation with work labs at 5.910 (.45-4.5), follow up with our labs normal as

## 2020-10-14 ENCOUNTER — OFFICE VISIT (OUTPATIENT)
Dept: OBGYN | Age: 65
End: 2020-10-14
Payer: MEDICARE

## 2020-10-14 VITALS
DIASTOLIC BLOOD PRESSURE: 80 MMHG | WEIGHT: 145 LBS | SYSTOLIC BLOOD PRESSURE: 104 MMHG | HEART RATE: 60 BPM | TEMPERATURE: 96.2 F | BODY MASS INDEX: 25.69 KG/M2 | HEIGHT: 63 IN

## 2020-10-14 PROCEDURE — 99212 OFFICE O/P EST SF 10 MIN: CPT

## 2020-10-14 PROCEDURE — G8427 DOCREV CUR MEDS BY ELIG CLIN: HCPCS | Performed by: NURSE PRACTITIONER

## 2020-10-14 PROCEDURE — 99213 OFFICE O/P EST LOW 20 MIN: CPT | Performed by: NURSE PRACTITIONER

## 2020-10-14 PROCEDURE — G8400 PT W/DXA NO RESULTS DOC: HCPCS | Performed by: NURSE PRACTITIONER

## 2020-10-14 PROCEDURE — G8417 CALC BMI ABV UP PARAM F/U: HCPCS | Performed by: NURSE PRACTITIONER

## 2020-10-14 PROCEDURE — 1036F TOBACCO NON-USER: CPT | Performed by: NURSE PRACTITIONER

## 2020-10-14 PROCEDURE — 4040F PNEUMOC VAC/ADMIN/RCVD: CPT | Performed by: NURSE PRACTITIONER

## 2020-10-14 PROCEDURE — 3017F COLORECTAL CA SCREEN DOC REV: CPT | Performed by: NURSE PRACTITIONER

## 2020-10-14 PROCEDURE — 1090F PRES/ABSN URINE INCON ASSESS: CPT | Performed by: NURSE PRACTITIONER

## 2020-10-14 PROCEDURE — G8484 FLU IMMUNIZE NO ADMIN: HCPCS | Performed by: NURSE PRACTITIONER

## 2020-10-14 PROCEDURE — 1123F ACP DISCUSS/DSCN MKR DOCD: CPT | Performed by: NURSE PRACTITIONER

## 2020-11-27 ENCOUNTER — APPOINTMENT (OUTPATIENT)
Dept: GENERAL RADIOLOGY | Age: 65
End: 2020-11-27
Payer: MEDICARE

## 2020-11-27 ENCOUNTER — HOSPITAL ENCOUNTER (EMERGENCY)
Age: 65
Discharge: HOME | End: 2020-11-27
Attending: EMERGENCY MEDICINE
Payer: MEDICARE

## 2020-11-27 VITALS
OXYGEN SATURATION: 98 % | SYSTOLIC BLOOD PRESSURE: 152 MMHG | WEIGHT: 145 LBS | HEART RATE: 88 BPM | TEMPERATURE: 99.5 F | RESPIRATION RATE: 16 BRPM | BODY MASS INDEX: 26.1 KG/M2 | DIASTOLIC BLOOD PRESSURE: 87 MMHG

## 2020-11-27 PROCEDURE — 73610 X-RAY EXAM OF ANKLE: CPT

## 2020-11-27 PROCEDURE — 99284 EMERGENCY DEPT VISIT MOD MDM: CPT

## 2020-11-27 ASSESSMENT — PAIN SCALES - GENERAL: PAINLEVEL_OUTOF10: 4

## 2020-11-27 ASSESSMENT — PAIN DESCRIPTION - PAIN TYPE: TYPE: ACUTE PAIN

## 2020-11-27 ASSESSMENT — PAIN DESCRIPTION - LOCATION: LOCATION: ANKLE

## 2020-11-27 ASSESSMENT — PAIN DESCRIPTION - ORIENTATION: ORIENTATION: LEFT

## 2020-11-27 NOTE — ED PROVIDER NOTES
[facility] ED  [address]  [place] [address] [place]  Phone: [phone]        [address] [place] [address] ED  EMERGENCY DEPARTMENT ENCOUNTER      Pt Name: Naeem Wang  MRN: [id number]  [place] 1955  Date of evaluation: 11/27/2020  Provider: Ulysses Cannon, [facility]       Chief Complaint   Patient presents with   [facility] Joint Swelling         HISTORY OF PRESENT ILLNESS   (Location/Symptom, Timing/Onset,Context/Setting, Quality, Duration, Modifying Factors, Severity)  Note limiting factors. Naeem Wang is a 76 y.o. female who presents to the emergency department for the evaluation of a fall and injury to her left ankle. This occurred about 2 hours prior to arrival when she misjudged a step. She twisted her left ankle and fell to the ground. She did not hit her head, she did not lose consciousness and she did not sustain any other injury. She had some pretty sharp pain in her left ankle at the time but it has subsided with the use of ibuprofen. She has no numbness or weakness in her left lower extremity. She is not on blood thinners. Nursing Notes were reviewed. REVIEW OF SYSTEMS    (2-9systems for level 4, 10 or more for level 5)     Review of Systems   Constitutional: Negative for fever. Musculoskeletal:        Left ankle pain   Neurological: Negative for weakness and numbness. Except asnoted above the remainder of the review of systems was reviewed and negative.        PAST MEDICAL HISTORY     Past Medical History:   Diagnosis Date    GERD (gastroesophageal reflux disease)     High cholesterol     Hormone replacement therapy     Postsurgical menopause 2003    Age 49         SURGICAL HISTORY       Past Surgical History:   Procedure Laterality Date   [place] Left 2018    Dr. Eder Bender, right 12/2018    COLONOSCOPY  06/14    normal -10 yrs    CYSTOCELE REPAIR      INGUINAL HERNIA REPAIR Right 01/24/06    [place] REMOVAL OF BREAST LESION Right 10/3/2018    Excision Right Breast Cyst performed by Mary Nuñez MD at [facility]. COLLAPSED CYST WITH MURAL ORGANIZING ACTIVE INFLAMMATION AND       ROTATOR CUFF REPAIR Right 2012    ROTATOR CUFF REPAIR Left 02/14    SERENITY AND BSO      TONSILLECTOMY      TUBAL LIGATION           CURRENT MEDICATIONS     Previous Medications    ASCORBIC ACID (VITAMIN C) 500 MG TABLET    Take 500 mg by mouth daily    CALCIUM-CHOLECALCIFEROL (CALCIUM 500+D) 500-200 MG-UNIT PER TABLET    Take 1 tablet by mouth daily    CHOLECALCIFEROL (VITAMIN D3) 2000 UNITS CAPS    Take by mouth    CLOBETASOL (TEMOVATE) 0.05 % CREAM    Apply topically 2 times daily for one week, daily for 2 weeks, every other day for 2 weeks, then twice weekly    MULTIPLE VITAMINS-MINERALS (MULTIVITAMIN PO)    Take 1 capsule by mouth daily    NAPROXEN SODIUM (ALEVE) 220 MG CAPS    Take by mouth as needed for Pain    OMEPRAZOLE (PRILOSEC) 20 MG DELAYED RELEASE CAPSULE    Take 1 capsule by mouth daily    SIMVASTATIN (ZOCOR) 40 MG TABLET    TAKE 1 TABLET NIGHTLY    VITAMIN B-12 (CYANOCOBALAMIN) 1000 MCG TABLET    Take 1,000 mcg by mouth daily       ALLERGIES     Lipitor [atorvastatin];  Percocet [oxycodone-acetaminophen]; and Vicodin [hydrocodone-acetaminophen]    FAMILY HISTORY       Family History   Problem Relation Age of Onset    Heart Disease Mother     High Blood Pressure Mother     High Cholesterol Mother     Thyroid Disease Mother     Other Mother         ascending aortic disection    Other Sister         Graves Disease    Cancer Sister         thyroid cancer    Thyroid Disease Sister           SOCIAL HISTORY       Social History     Socioeconomic History    Marital status:      Spouse name: None    Number of children: None    Years of education: None    Highest education level: None   Occupational History    None   Social Needs    Financial resource strain: None    Food insecurity     Worry: None     Inability: None    Musculoskeletal: Normal range of motion. General: Swelling, tenderness and signs of injury present. No deformity. Comments: There is tenderness and swelling in the left ankle, mostly the left lateral malleolus but around the ankle joint. Range of motion not significantly limited. It is tender to the touch. Normal pulse at the left DP   Skin:     General: Skin is warm and dry. Capillary Refill: Capillary refill takes less than 2 seconds. Findings: No rash. Neurological:      General: No focal deficit present. Mental Status: She is alert and oriented to person, place, and time. Mental status is at baseline. Sensory: No sensory deficit. Motor: No weakness. Comments: Speaking normally. No facial asymmetry. Moving all 4 extremities. She ambulated with crutches without significant issue doing this    Psychiatric:         Mood and Affect: Mood normal.         EMERGENCY DEPARTMENT COURSE and DIFFERENTIAL DIAGNOSIS/MDM:   Vitals:    Vitals:    11/27/20 1845 11/27/20 1846   BP:  (!) 152/87   Pulse: 88    Resp:  16   Temp: 99.5 °F (37.5 °C)    TempSrc:  Tympanic   SpO2:  98%   Weight:  145 lb (65.8 kg)       Patient presents to the emergency department with the complaint described above. Vital signs are grossly normal and she is nontoxic. She is neurovascularly intact in left lower extremity. At this time I am going to obtain x-ray to rule out fracture/dislocation. There is no head injury or other injury noted. I offered her pain medication but she states her pain is under control      DIAGNOSTIC RESULTS     LABS:  Labs Reviewed - No data to display    All other labs were within normal range or not returned as of this dictation. RADIOLOGY:  XR ANKLE LEFT (MIN 3 VIEWS)   Final Result   Transversely oriented minimally displaced fracture of the tip of the lateral   malleolus. Moderate to large ankle soft tissue swelling.                ED Course as of Nov 27 1920 Fri Nov 27, 2020   4425 Transversely oriented minimally displaced fracture of the tip of the lateral  Malleolus noted -based on her age and the fact that I do not want to make her rely on crutches, we will place the patient in a fracture boot and continue to advise ambulation with crutches and minimal weightbearing. She will need orthopedic follow-up.       [TS]   1915 I have recommended rest, ice and elevation and orthopedic follow-up    At this time the patient is without objective evidence of an acute process requiring hospitalization or inpatient management. They have remained hemodynamically stable and are stable for discharge with outpatient follow-up. Standard anticipatory guidance given to patient upon discharge. Have given them a specific time frame in which to follow-up and who to follow-up with. I have also advised them that they should return to the emergency department if they get worse, or not getting better or develop any new or concerning symptoms. Patient demonstrates understanding.    [TS]      ED Course User Index  [TS] Gonsalo Marcelo DO         PROCEDURES:  Unless otherwise noted below, none     Procedures    FINAL IMPRESSION      1.  Closed avulsion fracture of distal end of left fibula, initial encounter          DISPOSITION/PLAN   DISPOSITION Decision To Discharge 11/27/2020 07:19:21 PM      PATIENT REFERRED TO:  Barb Paez MD  [phone]    In 3 days        DISCHARGE MEDICATIONS:  New Prescriptions    No medications on file          (Please note that portions of this note were completed with a voice recognition program.  Efforts were made to edit the dictations but occasionally words are mis-transcribed.)    Gonsalo Marcelo DO (electronically signed)  Board Certified Emergency Physician          Gonsalo Marcelo DO  11/27/20 [id number] Teagan Agee DO  11/27/20 1309

## 2020-11-27 NOTE — ED TRIAGE NOTES
Tripped down a step twisting left ankle. Swelling and pain to left lateral malleolus. Occurred around  today. Went home and took Motrin, elevated and iced.   Is feeling better

## 2020-12-01 ENCOUNTER — OFFICE VISIT (OUTPATIENT)
Dept: ORTHOPEDIC SURGERY | Age: 65
End: 2020-12-01
Payer: MEDICARE

## 2020-12-01 VITALS
DIASTOLIC BLOOD PRESSURE: 66 MMHG | HEIGHT: 62 IN | WEIGHT: 145 LBS | SYSTOLIC BLOOD PRESSURE: 121 MMHG | BODY MASS INDEX: 26.68 KG/M2 | HEART RATE: 84 BPM

## 2020-12-01 PROCEDURE — 1090F PRES/ABSN URINE INCON ASSESS: CPT | Performed by: NURSE PRACTITIONER

## 2020-12-01 PROCEDURE — 4040F PNEUMOC VAC/ADMIN/RCVD: CPT | Performed by: NURSE PRACTITIONER

## 2020-12-01 PROCEDURE — G8417 CALC BMI ABV UP PARAM F/U: HCPCS | Performed by: NURSE PRACTITIONER

## 2020-12-01 PROCEDURE — 99213 OFFICE O/P EST LOW 20 MIN: CPT | Performed by: NURSE PRACTITIONER

## 2020-12-01 PROCEDURE — 27786 TREATMENT OF ANKLE FRACTURE: CPT | Performed by: NURSE PRACTITIONER

## 2020-12-01 PROCEDURE — 99213 OFFICE O/P EST LOW 20 MIN: CPT

## 2020-12-01 PROCEDURE — 3017F COLORECTAL CA SCREEN DOC REV: CPT | Performed by: NURSE PRACTITIONER

## 2020-12-01 PROCEDURE — 1123F ACP DISCUSS/DSCN MKR DOCD: CPT | Performed by: NURSE PRACTITIONER

## 2020-12-01 PROCEDURE — G8427 DOCREV CUR MEDS BY ELIG CLIN: HCPCS | Performed by: NURSE PRACTITIONER

## 2020-12-01 PROCEDURE — G8482 FLU IMMUNIZE ORDER/ADMIN: HCPCS | Performed by: NURSE PRACTITIONER

## 2020-12-01 PROCEDURE — G8400 PT W/DXA NO RESULTS DOC: HCPCS | Performed by: NURSE PRACTITIONER

## 2020-12-01 PROCEDURE — 1036F TOBACCO NON-USER: CPT | Performed by: NURSE PRACTITIONER

## 2020-12-01 NOTE — PROGRESS NOTES
Orthopaedic Progress Note      CHIEF COMPLAINT: Left lateral malleolus ankle fracture    HISTORY OF PRESENT ILLNESS:       Ms. Salty Escobar  is a 76 y.o. female  who presents with a left lateral malleolus ankle fracture that was closed nondisplaced. Patient states she had a fall this past Friday while outside trying to put up [employer] decorations. States that she rolled her ankle off of the sidewalk. She did go to the ER where she was given a walking boot and crutches. Patient states her pain is tolerable here today. She does have significant swelling and bruising within the left lower extremity. She denies numbness or tingling. She has been able to walk in the boot without difficulty. Past Medical History:    Past Medical History:   Diagnosis Date    GERD (gastroesophageal reflux disease)     High cholesterol     Hormone replacement therapy     Postsurgical menopause 2003    Age 49       Past Surgical History:    Past Surgical History:   Procedure Laterality Date    CARPAL TUNNEL RELEASE Left 2018    Dr. Michell Dobson, right 12/2018    COLONOSCOPY  06/14    normal -10 yrs    CYSTOCELE REPAIR      INGUINAL HERNIA REPAIR Right 01/24/06    [place] REMOVAL OF BREAST LESION Right 10/3/2018    Excision Right Breast Cyst performed by Rubi Walsh MD at [address] [place] [address].   COLLAPSED CYST WITH MURAL ORGANIZING ACTIVE INFLAMMATION AND       ROTATOR CUFF REPAIR Right 2012    ROTATOR CUFF REPAIR Left 02/14    SERENITY AND BSO      TONSILLECTOMY      TUBAL LIGATION           Current  Medications:  Current Outpatient Medications   Medication Sig Dispense Refill    simvastatin (ZOCOR) 40 MG tablet TAKE 1 TABLET NIGHTLY 30 tablet 5    clobetasol (TEMOVATE) 0.05 % cream Apply topically 2 times daily for one week, daily for 2 weeks, every other day for 2 weeks, then twice weekly 60 g 2    Naproxen Sodium (ALEVE) 220 MG CAPS Take by mouth as needed for Pain      omeprazole (PRILOSEC) 20 MG delayed release capsule Take 1 capsule by mouth daily 90 capsule 3    vitamin B-12 (CYANOCOBALAMIN) 1000 MCG tablet Take 1,000 mcg by mouth daily      Cholecalciferol (VITAMIN D3) 2000 units CAPS Take by mouth      calcium-cholecalciferol (CALCIUM 500+D) 500-200 MG-UNIT per tablet Take 1 tablet by mouth daily      Ascorbic Acid (VITAMIN C) 500 MG tablet Take 500 mg by mouth daily      Multiple Vitamins-Minerals (MULTIVITAMIN PO) Take 1 capsule by mouth daily       No current facility-administered medications for this visit. Allergies:  Lipitor [atorvastatin]; Percocet [oxycodone-acetaminophen]; and Vicodin [hydrocodone-acetaminophen]    Social History:   Social History     Tobacco Use   Smoking Status Never Smoker   Smokeless Tobacco Never Used     Social History     Substance and Sexual Activity   Alcohol Use Yes    Alcohol/week: 0.0 standard drinks    Comment: occasionally     Social History     Substance and Sexual Activity   Drug Use No       Family History:  Family History   Problem Relation Age of Onset    Heart Disease Mother     High Blood Pressure Mother     High Cholesterol Mother     Thyroid Disease Mother     Other Mother         ascending aortic disection    Other Sister         Graves Disease    Cancer Sister         thyroid cancer    Thyroid Disease Sister        REVIEW OF SYSTEMS:  Constitutional: Denies any fever, chills. Musculoskeletal: Positive for left ankle pain and swelling. PHYSICAL EXAM:  [unfilled]  General appearance:  Alert and oriented x 3. No apparent distress, appears stated age, calm and cooperative. Musculoskeletal: Left lower extremity was examined. Skin is intact no rashes or lesions. No redness warmth or cellulitis. Significant swelling noted within the left ankle and foot. Bruising noted through anterior lower leg down through the ankle and foot. Toe flexion and extension is intact. Ankle range of motion is intact.   Mild pain to palpation over the lateral malleolus. [facility] Billing DATA:  CBC:   Lab Results   Component Value Date    WBC 6.8 09/26/2020    HGB 14.0 09/26/2020     09/26/2020     BMP:    Lab Results   Component Value Date     09/26/2020    K 4.0 09/26/2020     09/26/2020    CO2 27 09/26/2020    BUN 16 09/26/2020    CREATININE 0.70 09/26/2020    CREATININE 0.6 06/06/2018    CALCIUM 10.2 09/26/2020    GLUCOSE 91 09/26/2020     PT/INR:  No results found for: PROTIME, INR  Troponin:  No results found for: TROPONINI  No results for input(s): LIPASE, AMYLASE in the last 72 hours. No results for input(s): AST, ALT, BILIDIR, BILITOT, ALKPHOS in the last 72 hours. Uric Acid:  No components found for: URIC  Urine Culture:  No components found for: CURINE    Radiology:   Xr Ankle Left (min 3 Views)    Result Date: 11/27/2020  EXAMINATION: THREE XRAY VIEWS OF THE LEFT ANKLE 11/27/2020 6:58 pm COMPARISON: None. HISTORY: ORDERING SYSTEM PROVIDED HISTORY: trauma, pain TECHNOLOGIST PROVIDED HISTORY: trauma, pain Reason for Exam: Left lateral ankle pain and swelling; fell earlier today; done portable Acuity: Acute Type of Exam: Initial FINDINGS: There is minimally displaced transversely oriented fracture of the tip of the lateral malleolus. The distal tibia and the talar dome appears intact. There is a small plantar calcaneal spur. There is moderate to large ankle soft tissue swelling, lateral greater than medial.     Transversely oriented minimally displaced fracture of the tip of the lateral malleolus. Moderate to large ankle soft tissue swelling. X-rays personally reviewed by me of x-rays 3 views of the left ankle does show a closed lateral malleolus ankle fracture. Diagnosis Orders   1. Closed fracture of distal lateral malleolus of left fibula, initial encounter           PLAN:  Plan at this time continue with walking boot weightbearing as tolerated. Applied a an Ace wrap to left lower extremity today to help with swelling.   Take over-the-counter pain medicine as needed. We will plan to follow-up with patient in 3 weeks at that time we will get new x-rays 3 views of the left ankle at that time. No orders of the defined types were placed in this encounter.        Procedure:        Electronically signed by DILIP Wesley CNP on 12/1/2020 at 12:30 PM

## 2020-12-22 ENCOUNTER — HOSPITAL ENCOUNTER (OUTPATIENT)
Dept: GENERAL RADIOLOGY | Age: 65
Discharge: HOME | End: 2020-12-24
Payer: MEDICARE

## 2020-12-22 ENCOUNTER — OFFICE VISIT (OUTPATIENT)
Dept: ORTHOPEDIC SURGERY | Age: 65
End: 2020-12-22
Payer: MEDICARE

## 2020-12-22 VITALS
HEIGHT: 62 IN | HEART RATE: 66 BPM | BODY MASS INDEX: 26.68 KG/M2 | WEIGHT: 145 LBS | DIASTOLIC BLOOD PRESSURE: 78 MMHG | SYSTOLIC BLOOD PRESSURE: 124 MMHG

## 2020-12-22 PROCEDURE — 73610 X-RAY EXAM OF ANKLE: CPT

## 2020-12-22 PROCEDURE — 99024 POSTOP FOLLOW-UP VISIT: CPT | Performed by: PHYSICIAN ASSISTANT

## 2020-12-22 PROCEDURE — 99213 OFFICE O/P EST LOW 20 MIN: CPT

## 2020-12-22 NOTE — PROGRESS NOTES
 Multiple Vitamins-Minerals (MULTIVITAMIN PO) Take 1 capsule by mouth daily       No current facility-administered medications for this visit. Allergies:  Lipitor [atorvastatin], Percocet [oxycodone-acetaminophen], and Vicodin [hydrocodone-acetaminophen]    Social History:   Social History     Tobacco Use   Smoking Status Never Smoker   Smokeless Tobacco Never Used     Social History     Substance and Sexual Activity   Alcohol Use Yes    Alcohol/week: 0.0 standard drinks    Comment: occasionally     Social History     Substance and Sexual Activity   Drug Use No       Family History:  Family History   Problem Relation Age of Onset    Heart Disease Mother     High Blood Pressure Mother     High Cholesterol Mother     Thyroid Disease Mother     Other Mother         ascending aortic disection    Other Sister         Graves Disease    Cancer Sister         thyroid cancer    Thyroid Disease Sister        REVIEW OF SYSTEMS:  Constitutional: Denies any fever, chills. Musculoskeletal: Positive for left ankle fracture lateral mall. PHYSICAL EXAM:  [unfilled]  General appearance:  Alert and oriented x 3. No apparent distress, appears stated age, calm and cooperative. Musculoskeletal: Full flexion-extension ankle swelling noted over the lateral aspect of the ankle. She is able to dorsiflex plantarflex inversion eversion without resistance or pain. DATA:  CBC:   Lab Results   Component Value Date    WBC 6.8 09/26/2020    HGB 14.0 09/26/2020     09/26/2020     BMP:    Lab Results   Component Value Date     09/26/2020    K 4.0 09/26/2020     09/26/2020    CO2 27 09/26/2020    BUN 16 09/26/2020    CREATININE 0.70 09/26/2020    CREATININE 0.6 06/06/2018    CALCIUM 10.2 09/26/2020    GLUCOSE 91 09/26/2020     PT/INR:  No results found for: PROTIME, INR  Troponin:  No results found for: TROPONINI  No results for input(s): LIPASE, AMYLASE in the last 72 hours. No results for input(s): AST, ALT, BILIDIR, BILITOT, ALKPHOS in the last 72 hours. Uric Acid:  No components found for: URIC  Urine Culture:  No components found for: CURINE    Radiology:   Xr Ankle Left (min 3 Views)    Result Date: 12/22/2020  EXAMINATION: THREE XRAY VIEWS OF THE LEFT ANKLE 12/22/2020 10:27 am COMPARISON: Three-view study of the left ankle from 11/27/2020 HISTORY: ORDERING SYSTEM PROVIDED HISTORY: Closed fracture of distal lateral malleolus of left fibula, initial encounter TECHNOLOGIST PROVIDED HISTORY: ankle pain Reason for Exam: Recheck ankle fracture 1 month Acuity: Acute Type of Exam: Subsequent/Follow-up FINDINGS: Interval decrease soft tissue swelling over the left lateral malleolus; again identified avulsion fracture from the tip lateral malleolus, with slightly greater distraction. No other fracture seen. No dislocation. Small joint effusion. Calcaneal enthesopathy, unchanged. Similar fracture tip lateral malleolus with slightly greater distraction; decreased overlying STS but no other interval change. Small joint effusion. Xr Ankle Left (min 3 Views)    Result Date: 11/27/2020  EXAMINATION: THREE XRAY VIEWS OF THE LEFT ANKLE 11/27/2020 6:58 pm COMPARISON: None. HISTORY: ORDERING SYSTEM PROVIDED HISTORY: trauma, pain TECHNOLOGIST PROVIDED HISTORY: trauma, pain Reason for Exam: Left lateral ankle pain and swelling; fell earlier today; done portable Acuity: Acute Type of Exam: Initial FINDINGS: There is minimally displaced transversely oriented fracture of the tip of the lateral malleolus. The distal tibia and the talar dome appears intact. There is a small plantar calcaneal spur. There is moderate to large ankle soft tissue swelling, lateral greater than medial.     Transversely oriented minimally displaced fracture of the tip of the lateral malleolus. Moderate to large ankle soft tissue swelling. X-rays personally reviewed by me of minimally displaced fracture of the tip of lateral malleolus noted on 3 views       Diagnosis Orders   1. Closed fracture of distal lateral malleolus of left fibula, initial encounter           PLAN:  Weight-bear as tolerated activity as tolerated have the patient follow-up with us on as-needed basis    No orders of the defined types were placed in this encounter.        Procedure:        Electronically signed by Asael Meléndez PA-C on 12/22/2020 at 11:02 AM

## 2021-03-22 ENCOUNTER — TELEPHONE (OUTPATIENT)
Dept: FAMILY MEDICINE CLINIC | Age: 66
End: 2021-03-22

## 2021-03-22 DIAGNOSIS — E78.00 PURE HYPERCHOLESTEROLEMIA: Primary | ICD-10-CM

## 2021-03-22 NOTE — TELEPHONE ENCOUNTER
Lab registration called in for pt,stating that she is scheduled for labs on Saturday but there are no labs ordered for this pt

## 2021-03-27 ENCOUNTER — HOSPITAL ENCOUNTER (OUTPATIENT)
Dept: LAB | Age: 66
Discharge: HOME | End: 2021-03-27
Payer: MEDICARE

## 2021-03-27 DIAGNOSIS — E78.00 PURE HYPERCHOLESTEROLEMIA: ICD-10-CM

## 2021-03-27 LAB
ABSOLUTE EOS #: 0.23 K/UL (ref 0–0.44)
ABSOLUTE IMMATURE GRANULOCYTE: 0.03 K/UL (ref 0–0.3)
ABSOLUTE LYMPH #: 1.96 K/UL (ref 1.1–3.7)
ABSOLUTE MONO #: 0.48 K/UL (ref 0.1–1.2)
ALBUMIN SERPL-MCNC: 4.4 G/DL (ref 3.5–5.2)
ALBUMIN/GLOBULIN RATIO: 1.8 (ref 1–2.5)
ALP BLD-CCNC: 106 U/L (ref 35–104)
ALT SERPL-CCNC: 12 U/L (ref 5–33)
ANION GAP SERPL CALCULATED.3IONS-SCNC: 9 MMOL/L (ref 9–17)
AST SERPL-CCNC: 18 U/L
BASOPHILS # BLD: 1 % (ref 0–2)
BASOPHILS ABSOLUTE: 0.03 K/UL (ref 0–0.2)
BILIRUB SERPL-MCNC: 0.6 MG/DL (ref 0.3–1.2)
BUN BLDV-MCNC: 10 MG/DL (ref 8–23)
BUN/CREAT BLD: 14 (ref 9–20)
CALCIUM SERPL-MCNC: 10.2 MG/DL (ref 8.6–10.4)
CHLORIDE BLD-SCNC: 106 MMOL/L (ref 98–107)
CHOLESTEROL/HDL RATIO: 3.5
CHOLESTEROL: 191 MG/DL
CO2: 30 MMOL/L (ref 20–31)
CREAT SERPL-MCNC: 0.7 MG/DL (ref 0.5–0.9)
DIFFERENTIAL TYPE: ABNORMAL
EOSINOPHILS RELATIVE PERCENT: 5 % (ref 1–4)
GFR AFRICAN AMERICAN: >60 ML/MIN
GFR NON-AFRICAN AMERICAN: >60 ML/MIN
GFR SERPL CREATININE-BSD FRML MDRD: ABNORMAL ML/MIN/{1.73_M2}
GFR SERPL CREATININE-BSD FRML MDRD: ABNORMAL ML/MIN/{1.73_M2}
GLUCOSE BLD-MCNC: 95 MG/DL (ref 70–99)
HCT VFR BLD CALC: 45.1 % (ref 36.3–47.1)
HDLC SERPL-MCNC: 55 MG/DL
HEMOGLOBIN: 14.2 G/DL (ref 11.9–15.1)
IMMATURE GRANULOCYTES: 1 %
LDL CHOLESTEROL: 110 MG/DL (ref 0–130)
LYMPHOCYTES # BLD: 40 % (ref 24–43)
MCH RBC QN AUTO: 29.7 PG (ref 25.2–33.5)
MCHC RBC AUTO-ENTMCNC: 31.5 G/DL (ref 25.2–33.5)
MCV RBC AUTO: 94.4 FL (ref 82.6–102.9)
MONOCYTES # BLD: 10 % (ref 3–12)
NRBC AUTOMATED: 0 PER 100 WBC
PDW BLD-RTO: 12.6 % (ref 11.8–14.4)
PLATELET # BLD: 253 K/UL (ref 138–453)
PLATELET ESTIMATE: ABNORMAL
PMV BLD AUTO: 9.6 FL (ref 8.1–13.5)
POTASSIUM SERPL-SCNC: 4.1 MMOL/L (ref 3.7–5.3)
RBC # BLD: 4.78 M/UL (ref 3.95–5.11)
RBC # BLD: ABNORMAL 10*6/UL
SEG NEUTROPHILS: 43 % (ref 36–65)
SEGMENTED NEUTROPHILS ABSOLUTE COUNT: 2.18 K/UL (ref 1.5–8.1)
SODIUM BLD-SCNC: 145 MMOL/L (ref 135–144)
TOTAL PROTEIN: 6.8 G/DL (ref 6.4–8.3)
TRIGL SERPL-MCNC: 128 MG/DL
VLDLC SERPL CALC-MCNC: NORMAL MG/DL (ref 1–30)
WBC # BLD: 4.9 K/UL (ref 3.5–11.3)
WBC # BLD: ABNORMAL 10*3/UL

## 2021-03-27 PROCEDURE — 80061 LIPID PANEL: CPT

## 2021-03-27 PROCEDURE — 85025 COMPLETE CBC W/AUTO DIFF WBC: CPT

## 2021-03-27 PROCEDURE — 36415 COLL VENOUS BLD VENIPUNCTURE: CPT

## 2021-03-27 PROCEDURE — 80053 COMPREHEN METABOLIC PANEL: CPT

## 2021-03-31 ENCOUNTER — OFFICE VISIT (OUTPATIENT)
Dept: FAMILY MEDICINE CLINIC | Age: 66
End: 2021-03-31
Payer: MEDICARE

## 2021-03-31 VITALS
HEIGHT: 63 IN | BODY MASS INDEX: 26.05 KG/M2 | HEART RATE: 68 BPM | WEIGHT: 147 LBS | SYSTOLIC BLOOD PRESSURE: 120 MMHG | DIASTOLIC BLOOD PRESSURE: 72 MMHG

## 2021-03-31 DIAGNOSIS — R79.89 TSH ELEVATION: ICD-10-CM

## 2021-03-31 DIAGNOSIS — K21.9 GASTROESOPHAGEAL REFLUX DISEASE WITHOUT ESOPHAGITIS: Primary | ICD-10-CM

## 2021-03-31 DIAGNOSIS — E07.9 DISORDER OF THYROID, UNSPECIFIED: ICD-10-CM

## 2021-03-31 DIAGNOSIS — Z79.890 HORMONE REPLACEMENT THERAPY: ICD-10-CM

## 2021-03-31 DIAGNOSIS — M19.041 PRIMARY OSTEOARTHRITIS OF BOTH HANDS: ICD-10-CM

## 2021-03-31 DIAGNOSIS — E78.00 PURE HYPERCHOLESTEROLEMIA: ICD-10-CM

## 2021-03-31 DIAGNOSIS — M19.042 PRIMARY OSTEOARTHRITIS OF BOTH HANDS: ICD-10-CM

## 2021-03-31 DIAGNOSIS — I87.2 VENOUS INSUFFICIENCY: ICD-10-CM

## 2021-03-31 PROCEDURE — 1036F TOBACCO NON-USER: CPT | Performed by: FAMILY MEDICINE

## 2021-03-31 PROCEDURE — G8427 DOCREV CUR MEDS BY ELIG CLIN: HCPCS | Performed by: FAMILY MEDICINE

## 2021-03-31 PROCEDURE — 1123F ACP DISCUSS/DSCN MKR DOCD: CPT | Performed by: FAMILY MEDICINE

## 2021-03-31 PROCEDURE — 99213 OFFICE O/P EST LOW 20 MIN: CPT

## 2021-03-31 PROCEDURE — G8400 PT W/DXA NO RESULTS DOC: HCPCS | Performed by: FAMILY MEDICINE

## 2021-03-31 PROCEDURE — 99214 OFFICE O/P EST MOD 30 MIN: CPT | Performed by: FAMILY MEDICINE

## 2021-03-31 PROCEDURE — G8417 CALC BMI ABV UP PARAM F/U: HCPCS | Performed by: FAMILY MEDICINE

## 2021-03-31 PROCEDURE — G8482 FLU IMMUNIZE ORDER/ADMIN: HCPCS | Performed by: FAMILY MEDICINE

## 2021-03-31 PROCEDURE — 4040F PNEUMOC VAC/ADMIN/RCVD: CPT | Performed by: FAMILY MEDICINE

## 2021-03-31 PROCEDURE — 3017F COLORECTAL CA SCREEN DOC REV: CPT | Performed by: FAMILY MEDICINE

## 2021-03-31 PROCEDURE — 1090F PRES/ABSN URINE INCON ASSESS: CPT | Performed by: FAMILY MEDICINE

## 2021-03-31 RX ORDER — SIMVASTATIN 40 MG
TABLET ORAL
Qty: 90 TABLET | Refills: 3 | Status: SHIPPED | OUTPATIENT
Start: 2021-03-31 | End: 2022-04-06 | Stop reason: SDUPTHER

## 2021-03-31 ASSESSMENT — ENCOUNTER SYMPTOMS
GASTROINTESTINAL NEGATIVE: 1
RESPIRATORY NEGATIVE: 1
ALLERGIC/IMMUNOLOGIC NEGATIVE: 1
EYES NEGATIVE: 1
BACK PAIN: 0

## 2021-03-31 ASSESSMENT — PATIENT HEALTH QUESTIONNAIRE - PHQ9
1. LITTLE INTEREST OR PLEASURE IN DOING THINGS: 0
SUM OF ALL RESPONSES TO PHQ QUESTIONS 1-9: 0
SUM OF ALL RESPONSES TO PHQ QUESTIONS 1-9: 0

## 2021-03-31 NOTE — PROGRESS NOTES
Subjective:      Patient ID: Soren Clay is a 76 y.o. female. Gastroesophageal Reflux    Hyperlipidemia    Hand Pain         Routine follow up on chronic medical conditions, refills, and review of updated labs. I have reviewed the patient's medical history in detail and updated the computerized patient record. ultrasound views of the right breast revealed a complicated cyst.  No solid mass. She  Had excisional biopsy of the mass 10/3/18, benign pathology per patient report. Rare gerd symptoms. She relates symptoms after forgetting her omeprazole one day. Nothing persistent. Feeling well overall. Some joint pain in the fingers/hands. She notices making her bed in the morning harder. Fingers seem stiff. Harder to nicola. No interval falls. She had 2 accidental trips/falls in the past.  Wrist fracture. No recurrent falls. She reports tripping over unseen obstacles. Past Medical History:   Diagnosis Date    GERD (gastroesophageal reflux disease)     High cholesterol     Hormone replacement therapy     Postsurgical menopause 2003    Age 49     Current Outpatient Medications   Medication Sig Dispense Refill    OMEPRAZOLE PO Take 20 mg by mouth daily      simvastatin (ZOCOR) 40 MG tablet TAKE 1 TABLET NIGHTLY 30 tablet 5    Naproxen Sodium (ALEVE) 220 MG CAPS Take by mouth as needed for Pain      vitamin B-12 (CYANOCOBALAMIN) 1000 MCG tablet Take 1,000 mcg by mouth daily      Cholecalciferol (VITAMIN D3) 2000 units CAPS Take by mouth      calcium-cholecalciferol (CALCIUM 500+D) 500-200 MG-UNIT per tablet Take 1 tablet by mouth daily      Ascorbic Acid (VITAMIN C) 500 MG tablet Take 500 mg by mouth daily      Multiple Vitamins-Minerals (MULTIVITAMIN PO) Take 1 capsule by mouth daily       No current facility-administered medications for this visit.       Allergies   Allergen Reactions    Lipitor [Atorvastatin] Other (See Comments)     Myalgias        Percocet [Oxycodone-Acetaminophen] Nausea Only    Vicodin [Hydrocodone-Acetaminophen] Nausea Only       Review of Systems   Constitutional: Negative. HENT: Negative. Eyes: Negative. Respiratory: Negative. Cardiovascular: Negative. Gastrointestinal: Negative. Genitourinary: Negative. Musculoskeletal: Negative. Negative for arthralgias and back pain. Skin: Negative. Allergic/Immunologic: Negative. Neurological: Negative. Hematological: Negative. Psychiatric/Behavioral: Negative. Objective:   Physical Exam  Constitutional:       General: She is not in acute distress. Appearance: She is well-developed. HENT:      Head: Normocephalic and atraumatic. Right Ear: External ear normal.      Left Ear: External ear normal.      Nose: Nose normal.      Mouth/Throat:      Pharynx: No oropharyngeal exudate. Eyes:      Conjunctiva/sclera: Conjunctivae normal.   Neck:      Musculoskeletal: Neck supple. Thyroid: No thyromegaly. Cardiovascular:      Rate and Rhythm: Normal rate and regular rhythm. Heart sounds: Normal heart sounds. No murmur. Pulmonary:      Effort: Pulmonary effort is normal. No respiratory distress. Breath sounds: No wheezing. Abdominal:      General: Bowel sounds are normal. There is no distension. Palpations: Abdomen is soft. Tenderness: There is no abdominal tenderness. Musculoskeletal:         General: No tenderness. Right hand: She exhibits deformity (slight enlargement of right index mcp, otherwise no significant deformity or inflammatory changes). Lymphadenopathy:      Cervical: No cervical adenopathy. Skin:     General: Skin is warm and dry. Findings: No erythema or rash. Neurological:      Mental Status: She is alert and oriented to person, place, and time. Psychiatric:         Behavior: Behavior normal.         Thought Content:  Thought content normal.         Judgment: Judgment normal.       /72 (Site: Right Upper Arm, Position: Sitting, Cuff Size: Large Adult)   Pulse 68   Ht 5' 3\" (1.6 m)   Wt 147 lb (66.7 kg)   LMP  (LMP Unknown)   BMI 26.04 kg/m²   Results for orders placed or performed during the hospital encounter of 03/27/21   Lipid Panel   Result Value Ref Range    Cholesterol 191 <200 mg/dL    HDL 55 >40 mg/dL    LDL Cholesterol 110 0 - 130 mg/dL    Chol/HDL Ratio 3.5 <5    Triglycerides 128 <150 mg/dL    VLDL NOT REPORTED 1 - 30 mg/dL   Comprehensive Metabolic Panel   Result Value Ref Range    Glucose 95 70 - 99 mg/dL    BUN 10 8 - 23 mg/dL    CREATININE 0.70 0.50 - 0.90 mg/dL    Bun/Cre Ratio 14 9 - 20    Calcium 10.2 8.6 - 10.4 mg/dL    Sodium 145 (H) 135 - 144 mmol/L    Potassium 4.1 3.7 - 5.3 mmol/L    Chloride 106 98 - 107 mmol/L    CO2 30 20 - 31 mmol/L    Anion Gap 9 9 - 17 mmol/L    Alkaline Phosphatase 106 (H) 35 - 104 U/L    ALT 12 5 - 33 U/L    AST 18 <32 U/L    Total Bilirubin 0.60 0.3 - 1.2 mg/dL    Total Protein 6.8 6.4 - 8.3 g/dL    Albumin 4.4 3.5 - 5.2 g/dL    Albumin/Globulin Ratio 1.8 1.0 - 2.5    GFR Non-African American >60 >60 mL/min    GFR African American >60 >60 mL/min    GFR Comment          GFR Staging NOT REPORTED    CBC With Auto Differential   Result Value Ref Range    WBC 4.9 3.5 - 11.3 k/uL    RBC 4.78 3.95 - 5.11 m/uL    Hemoglobin 14.2 11.9 - 15.1 g/dL    Hematocrit 45.1 36.3 - 47.1 %    MCV 94.4 82.6 - 102.9 fL    MCH 29.7 25.2 - 33.5 pg    MCHC 31.5 25.2 - 33.5 g/dL    RDW 12.6 11.8 - 14.4 %    Platelets 717 903 - 716 k/uL    MPV 9.6 8.1 - 13.5 fL    NRBC Automated 0.0 0.0 per 100 WBC    Differential Type NOT REPORTED     Seg Neutrophils 43 36 - 65 %    Lymphocytes 40 24 - 43 %    Monocytes 10 3 - 12 %    Eosinophils % 5 (H) 1 - 4 %    Basophils 1 0 - 2 %    Immature Granulocytes 1 (H) 0 %    Segs Absolute 2.18 1.50 - 8.10 k/uL    Absolute Lymph # 1.96 1.10 - 3.70 k/uL    Absolute Mono # 0.48 0.10 - 1.20 k/uL    Absolute Eos # 0.23 0.00 - 0.44 k/uL    Basophils doses of covid vaccination.

## 2021-03-31 NOTE — PATIENT INSTRUCTIONS
Hospital Outpatient Visit on 03/27/2021   Component Date Value Ref Range Status    Cholesterol 03/27/2021 191  <200 mg/dL Final    Comment:    Cholesterol Guidelines:      <200  Desirable   200-240  Borderline      >240  Undesirable         HDL 03/27/2021 55  >40 mg/dL Final    Comment:    HDL Guidelines:    <40     Undesirable   40-59    Borderline    >59     Desirable         LDL Cholesterol 03/27/2021 110  0 - 130 mg/dL Final    Comment:    LDL Guidelines:     <100    Desirable   100-129   Near to/above Desirable   130-159   Borderline      >159   Undesirable     Direct (measured) LDL and calculated LDL are not interchangeable tests.  Chol/HDL Ratio 03/27/2021 3.5  <5 Final            Triglycerides 03/27/2021 128  <150 mg/dL Final    Comment:    Triglyceride Guidelines:     <150   Desirable   150-199  Borderline   200-499  High     >499   Very high   Based on AHA Guidelines for fasting triglyceride, October 2012.          VLDL 03/27/2021 NOT REPORTED  1 - 30 mg/dL Final    Glucose 03/27/2021 95  70 - 99 mg/dL Final    BUN 03/27/2021 10  8 - 23 mg/dL Final    CREATININE 03/27/2021 0.70  0.50 - 0.90 mg/dL Final    Bun/Cre Ratio 03/27/2021 14  9 - 20 Final    Calcium 03/27/2021 10.2  8.6 - 10.4 mg/dL Final    Sodium 03/27/2021 145* 135 - 144 mmol/L Final    Potassium 03/27/2021 4.1  3.7 - 5.3 mmol/L Final    Chloride 03/27/2021 106  98 - 107 mmol/L Final    CO2 03/27/2021 30  20 - 31 mmol/L Final    Anion Gap 03/27/2021 9  9 - 17 mmol/L Final    Alkaline Phosphatase 03/27/2021 106* 35 - 104 U/L Final    ALT 03/27/2021 12  5 - 33 U/L Final    AST 03/27/2021 18  <32 U/L Final    Total Bilirubin 03/27/2021 0.60  0.3 - 1.2 mg/dL Final    Total Protein 03/27/2021 6.8  6.4 - 8.3 g/dL Final    Albumin 03/27/2021 4.4  3.5 - 5.2 g/dL Final    Albumin/Globulin Ratio 03/27/2021 1.8  1.0 - 2.5 Final    GFR Non- 03/27/2021 >60  >60 mL/min Final    GFR  03/27/2021 >60 >60 mL/min Final    GFR Comment 03/27/2021        Final    Comment: Average GFR for 61-76 years old:   80 mL/min/1.73sq m  Chronic Kidney Disease:   <60 mL/min/1.73sq m  Kidney failure:   <15 mL/min/1.73sq m              GFR is a calculated value that has proven clinically to  be a more effective measure of   kidney function when reported with serum creatinine.             GFR Staging 03/27/2021 NOT REPORTED   Final    WBC 03/27/2021 4.9  3.5 - 11.3 k/uL Final    RBC 03/27/2021 4.78  3.95 - 5.11 m/uL Final    Hemoglobin 03/27/2021 14.2  11.9 - 15.1 g/dL Final    Hematocrit 03/27/2021 45.1  36.3 - 47.1 % Final    MCV 03/27/2021 94.4  82.6 - 102.9 fL Final    MCH 03/27/2021 29.7  25.2 - 33.5 pg Final    MCHC 03/27/2021 31.5  25.2 - 33.5 g/dL Final    RDW 03/27/2021 12.6  11.8 - 14.4 % Final    Platelets 33/05/5122 253  138 - 453 k/uL Final    MPV 03/27/2021 9.6  8.1 - 13.5 fL Final    NRBC Automated 03/27/2021 0.0  0.0 per 100 WBC Final    Differential Type 03/27/2021 NOT REPORTED   Final    Seg Neutrophils 03/27/2021 43  36 - 65 % Final    Lymphocytes 03/27/2021 40  24 - 43 % Final    Monocytes 03/27/2021 10  3 - 12 % Final    Eosinophils % 03/27/2021 5* 1 - 4 % Final    Basophils 03/27/2021 1  0 - 2 % Final    Immature Granulocytes 03/27/2021 1* 0 % Final    Segs Absolute 03/27/2021 2.18  1.50 - 8.10 k/uL Final    Absolute Lymph # 03/27/2021 1.96  1.10 - 3.70 k/uL Final    Absolute Mono # 03/27/2021 0.48  0.10 - 1.20 k/uL Final    Absolute Eos # 03/27/2021 0.23  0.00 - 0.44 k/uL Final    Basophils Absolute 03/27/2021 0.03  0.00 - 0.20 k/uL Final    Absolute Immature Granulocyte 03/27/2021 0.03  0.00 - 0.30 k/uL Final    WBC Morphology 03/27/2021 NOT REPORTED   Final    RBC Morphology 03/27/2021 NOT REPORTED   Final    Platelet Estimate 29/89/6098 NOT REPORTED   Final

## 2021-04-01 ENCOUNTER — TELEPHONE (OUTPATIENT)
Dept: FAMILY MEDICINE CLINIC | Age: 66
End: 2021-04-01

## 2021-04-01 NOTE — TELEPHONE ENCOUNTER
Pt calling stating she was discussing pneumovax with Lillie yesterday and pt had thought she had gotten the 2nd dose but pt got records from [facility] and states she did not get the 2nd dose, please advise at above number.

## 2021-05-11 RX ORDER — OMEPRAZOLE 20 MG/1
20 CAPSULE, DELAYED RELEASE ORAL DAILY
Qty: 90 CAPSULE | Refills: 3 | Status: SHIPPED | OUTPATIENT
Start: 2021-05-11 | End: 2022-04-06 | Stop reason: ALTCHOICE

## 2021-09-30 ENCOUNTER — HOSPITAL ENCOUNTER (OUTPATIENT)
Dept: MAMMOGRAPHY | Age: 66
Discharge: HOME | End: 2021-10-02
Payer: MEDICARE

## 2021-09-30 VITALS — BODY MASS INDEX: 23.9 KG/M2 | HEIGHT: 64 IN | WEIGHT: 140 LBS

## 2021-09-30 DIAGNOSIS — Z12.31 OTHER SCREENING MAMMOGRAM: ICD-10-CM

## 2021-09-30 PROCEDURE — 77063 BREAST TOMOSYNTHESIS BI: CPT

## 2021-10-02 ENCOUNTER — HOSPITAL ENCOUNTER (OUTPATIENT)
Dept: LAB | Age: 66
Discharge: HOME | End: 2021-10-02
Payer: MEDICARE

## 2021-10-02 DIAGNOSIS — E78.00 PURE HYPERCHOLESTEROLEMIA: ICD-10-CM

## 2021-10-02 DIAGNOSIS — E07.9 DISORDER OF THYROID, UNSPECIFIED: ICD-10-CM

## 2021-10-02 DIAGNOSIS — R79.89 TSH ELEVATION: ICD-10-CM

## 2021-10-02 LAB
ABSOLUTE EOS #: 0.2 K/UL (ref 0–0.44)
ABSOLUTE IMMATURE GRANULOCYTE: <0.03 K/UL (ref 0–0.3)
ABSOLUTE LYMPH #: 1.87 K/UL (ref 1.1–3.7)
ABSOLUTE MONO #: 0.56 K/UL (ref 0.1–1.2)
ALBUMIN SERPL-MCNC: 4.2 G/DL (ref 3.5–5.2)
ALBUMIN/GLOBULIN RATIO: 1.8 (ref 1–2.5)
ALP BLD-CCNC: 106 U/L (ref 35–104)
ALT SERPL-CCNC: 19 U/L (ref 5–33)
ANION GAP SERPL CALCULATED.3IONS-SCNC: 8 MMOL/L (ref 9–17)
AST SERPL-CCNC: 22 U/L
BASOPHILS # BLD: 0 % (ref 0–2)
BASOPHILS ABSOLUTE: <0.03 K/UL (ref 0–0.2)
BILIRUB SERPL-MCNC: 0.42 MG/DL (ref 0.3–1.2)
BUN BLDV-MCNC: 14 MG/DL (ref 8–23)
BUN/CREAT BLD: 25 (ref 9–20)
CALCIUM SERPL-MCNC: 9.8 MG/DL (ref 8.6–10.4)
CHLORIDE BLD-SCNC: 108 MMOL/L (ref 98–107)
CHOLESTEROL/HDL RATIO: 3.2
CHOLESTEROL: 185 MG/DL
CO2: 28 MMOL/L (ref 20–31)
CREAT SERPL-MCNC: 0.56 MG/DL (ref 0.5–0.9)
DIFFERENTIAL TYPE: NORMAL
EOSINOPHILS RELATIVE PERCENT: 3 % (ref 1–4)
GFR AFRICAN AMERICAN: >60 ML/MIN
GFR NON-AFRICAN AMERICAN: >60 ML/MIN
GFR SERPL CREATININE-BSD FRML MDRD: ABNORMAL ML/MIN/{1.73_M2}
GFR SERPL CREATININE-BSD FRML MDRD: ABNORMAL ML/MIN/{1.73_M2}
GLUCOSE BLD-MCNC: 91 MG/DL (ref 70–99)
HCT VFR BLD CALC: 42.9 % (ref 36.3–47.1)
HDLC SERPL-MCNC: 57 MG/DL
HEMOGLOBIN: 13.7 G/DL (ref 11.9–15.1)
IMMATURE GRANULOCYTES: 0 %
LDL CHOLESTEROL: 104 MG/DL (ref 0–130)
LYMPHOCYTES # BLD: 28 % (ref 24–43)
MCH RBC QN AUTO: 30.1 PG (ref 25.2–33.5)
MCHC RBC AUTO-ENTMCNC: 31.9 G/DL (ref 25.2–33.5)
MCV RBC AUTO: 94.3 FL (ref 82.6–102.9)
MONOCYTES # BLD: 8 % (ref 3–12)
NRBC AUTOMATED: 0 PER 100 WBC
PDW BLD-RTO: 12.7 % (ref 11.8–14.4)
PLATELET # BLD: 226 K/UL (ref 138–453)
PLATELET ESTIMATE: NORMAL
PMV BLD AUTO: 9.6 FL (ref 8.1–13.5)
POTASSIUM SERPL-SCNC: 4 MMOL/L (ref 3.7–5.3)
RBC # BLD: 4.55 M/UL (ref 3.95–5.11)
RBC # BLD: NORMAL 10*6/UL
SEG NEUTROPHILS: 61 % (ref 36–65)
SEGMENTED NEUTROPHILS ABSOLUTE COUNT: 4.03 K/UL (ref 1.5–8.1)
SODIUM BLD-SCNC: 144 MMOL/L (ref 135–144)
THYROXINE, FREE: 0.96 NG/DL (ref 0.93–1.7)
TOTAL PROTEIN: 6.6 G/DL (ref 6.4–8.3)
TRIGL SERPL-MCNC: 118 MG/DL
TSH SERPL DL<=0.05 MIU/L-ACNC: 4.65 MIU/L (ref 0.3–5)
VLDLC SERPL CALC-MCNC: NORMAL MG/DL (ref 1–30)
WBC # BLD: 6.7 K/UL (ref 3.5–11.3)
WBC # BLD: NORMAL 10*3/UL

## 2021-10-02 PROCEDURE — 80053 COMPREHEN METABOLIC PANEL: CPT

## 2021-10-02 PROCEDURE — 80061 LIPID PANEL: CPT

## 2021-10-02 PROCEDURE — 84443 ASSAY THYROID STIM HORMONE: CPT

## 2021-10-02 PROCEDURE — 36415 COLL VENOUS BLD VENIPUNCTURE: CPT

## 2021-10-02 PROCEDURE — 84439 ASSAY OF FREE THYROXINE: CPT

## 2021-10-02 PROCEDURE — 85025 COMPLETE CBC W/AUTO DIFF WBC: CPT

## 2021-10-04 ENCOUNTER — TELEPHONE (OUTPATIENT)
Dept: FAMILY MEDICINE CLINIC | Age: 66
End: 2021-10-04

## 2021-10-04 NOTE — TELEPHONE ENCOUNTER
Wife calling stating her  has an appt today and wife states she always is scheduled with her pt, and she needs to be put on the scheduled for today with her , please advise.

## 2022-03-29 DIAGNOSIS — Z00.00 ROUTINE GENERAL MEDICAL EXAMINATION AT A HEALTH CARE FACILITY: Primary | ICD-10-CM

## 2022-04-02 ENCOUNTER — HOSPITAL ENCOUNTER (OUTPATIENT)
Dept: LAB | Age: 67
Discharge: HOME | End: 2022-04-02
Payer: MEDICARE

## 2022-04-02 DIAGNOSIS — Z00.00 ROUTINE GENERAL MEDICAL EXAMINATION AT A HEALTH CARE FACILITY: ICD-10-CM

## 2022-04-02 LAB
ABSOLUTE EOS #: 0.24 K/UL (ref 0–0.44)
ABSOLUTE IMMATURE GRANULOCYTE: <0.03 K/UL (ref 0–0.3)
ABSOLUTE LYMPH #: 1.68 K/UL (ref 1.1–3.7)
ABSOLUTE MONO #: 0.53 K/UL (ref 0.1–1.2)
ALBUMIN SERPL-MCNC: 4.4 G/DL (ref 3.5–5.2)
ALBUMIN/GLOBULIN RATIO: 1.8 (ref 1–2.5)
ALP BLD-CCNC: 111 U/L (ref 35–104)
ALT SERPL-CCNC: 14 U/L (ref 5–33)
ANION GAP SERPL CALCULATED.3IONS-SCNC: 7 MMOL/L (ref 9–17)
AST SERPL-CCNC: 16 U/L
BASOPHILS # BLD: 1 % (ref 0–2)
BASOPHILS ABSOLUTE: 0.04 K/UL (ref 0–0.2)
BILIRUB SERPL-MCNC: 0.44 MG/DL (ref 0.3–1.2)
BUN BLDV-MCNC: 13 MG/DL (ref 8–23)
BUN/CREAT BLD: 19 (ref 9–20)
CALCIUM SERPL-MCNC: 9.8 MG/DL (ref 8.6–10.4)
CHLORIDE BLD-SCNC: 103 MMOL/L (ref 98–107)
CHOLESTEROL, FASTING: 194 MG/DL
CHOLESTEROL/HDL RATIO: 3.5
CO2: 32 MMOL/L (ref 20–31)
CREAT SERPL-MCNC: 0.68 MG/DL (ref 0.5–0.9)
EOSINOPHILS RELATIVE PERCENT: 5 % (ref 1–4)
GFR AFRICAN AMERICAN: >60 ML/MIN
GFR NON-AFRICAN AMERICAN: >60 ML/MIN
GFR SERPL CREATININE-BSD FRML MDRD: ABNORMAL ML/MIN/{1.73_M2}
GLUCOSE BLD-MCNC: 87 MG/DL (ref 70–99)
HCT VFR BLD CALC: 42.8 % (ref 36.3–47.1)
HDLC SERPL-MCNC: 55 MG/DL
HEMOGLOBIN: 13.9 G/DL (ref 11.9–15.1)
IMMATURE GRANULOCYTES: 0 %
LDL CHOLESTEROL: 114 MG/DL (ref 0–130)
LYMPHOCYTES # BLD: 34 % (ref 24–43)
MCH RBC QN AUTO: 30.3 PG (ref 25.2–33.5)
MCHC RBC AUTO-ENTMCNC: 32.5 G/DL (ref 25.2–33.5)
MCV RBC AUTO: 93.4 FL (ref 82.6–102.9)
MONOCYTES # BLD: 11 % (ref 3–12)
NRBC AUTOMATED: 0 PER 100 WBC
PDW BLD-RTO: 13 % (ref 11.8–14.4)
PLATELET # BLD: 246 K/UL (ref 138–453)
PMV BLD AUTO: 9.5 FL (ref 8.1–13.5)
POTASSIUM SERPL-SCNC: 4.1 MMOL/L (ref 3.7–5.3)
RBC # BLD: 4.58 M/UL (ref 3.95–5.11)
SEG NEUTROPHILS: 49 % (ref 36–65)
SEGMENTED NEUTROPHILS ABSOLUTE COUNT: 2.43 K/UL (ref 1.5–8.1)
SODIUM BLD-SCNC: 142 MMOL/L (ref 135–144)
THYROXINE, FREE: 1.1 NG/DL (ref 0.93–1.7)
TOTAL PROTEIN: 6.8 G/DL (ref 6.4–8.3)
TRIGLYCERIDE, FASTING: 126 MG/DL
TSH SERPL DL<=0.05 MIU/L-ACNC: 3.02 UIU/ML (ref 0.3–5)
WBC # BLD: 4.9 K/UL (ref 3.5–11.3)

## 2022-04-02 PROCEDURE — 36415 COLL VENOUS BLD VENIPUNCTURE: CPT

## 2022-04-02 PROCEDURE — 80061 LIPID PANEL: CPT

## 2022-04-02 PROCEDURE — 80053 COMPREHEN METABOLIC PANEL: CPT

## 2022-04-02 PROCEDURE — 85025 COMPLETE CBC W/AUTO DIFF WBC: CPT

## 2022-04-02 PROCEDURE — 84443 ASSAY THYROID STIM HORMONE: CPT

## 2022-04-02 PROCEDURE — 84439 ASSAY OF FREE THYROXINE: CPT

## 2022-04-06 ENCOUNTER — OFFICE VISIT (OUTPATIENT)
Dept: FAMILY MEDICINE CLINIC | Age: 67
End: 2022-04-06
Payer: MEDICARE

## 2022-04-06 VITALS
HEIGHT: 63 IN | DIASTOLIC BLOOD PRESSURE: 60 MMHG | HEART RATE: 68 BPM | SYSTOLIC BLOOD PRESSURE: 122 MMHG | WEIGHT: 143 LBS | BODY MASS INDEX: 25.34 KG/M2

## 2022-04-06 DIAGNOSIS — E78.00 PURE HYPERCHOLESTEROLEMIA: ICD-10-CM

## 2022-04-06 DIAGNOSIS — M19.041 PRIMARY OSTEOARTHRITIS OF BOTH HANDS: ICD-10-CM

## 2022-04-06 DIAGNOSIS — R79.89 TSH ELEVATION: ICD-10-CM

## 2022-04-06 DIAGNOSIS — E07.9 DISORDER OF THYROID, UNSPECIFIED: ICD-10-CM

## 2022-04-06 DIAGNOSIS — K21.9 GASTROESOPHAGEAL REFLUX DISEASE WITHOUT ESOPHAGITIS: Primary | ICD-10-CM

## 2022-04-06 DIAGNOSIS — M19.042 PRIMARY OSTEOARTHRITIS OF BOTH HANDS: ICD-10-CM

## 2022-04-06 DIAGNOSIS — E03.8 OTHER SPECIFIED HYPOTHYROIDISM: ICD-10-CM

## 2022-04-06 DIAGNOSIS — Z79.890 HORMONE REPLACEMENT THERAPY: ICD-10-CM

## 2022-04-06 DIAGNOSIS — I87.2 VENOUS INSUFFICIENCY: ICD-10-CM

## 2022-04-06 PROCEDURE — 99212 OFFICE O/P EST SF 10 MIN: CPT

## 2022-04-06 PROCEDURE — 3017F COLORECTAL CA SCREEN DOC REV: CPT | Performed by: FAMILY MEDICINE

## 2022-04-06 PROCEDURE — G8427 DOCREV CUR MEDS BY ELIG CLIN: HCPCS | Performed by: FAMILY MEDICINE

## 2022-04-06 PROCEDURE — 99214 OFFICE O/P EST MOD 30 MIN: CPT | Performed by: FAMILY MEDICINE

## 2022-04-06 PROCEDURE — G8400 PT W/DXA NO RESULTS DOC: HCPCS | Performed by: FAMILY MEDICINE

## 2022-04-06 PROCEDURE — 4040F PNEUMOC VAC/ADMIN/RCVD: CPT | Performed by: FAMILY MEDICINE

## 2022-04-06 PROCEDURE — 1090F PRES/ABSN URINE INCON ASSESS: CPT | Performed by: FAMILY MEDICINE

## 2022-04-06 PROCEDURE — 1123F ACP DISCUSS/DSCN MKR DOCD: CPT | Performed by: FAMILY MEDICINE

## 2022-04-06 PROCEDURE — 1036F TOBACCO NON-USER: CPT | Performed by: FAMILY MEDICINE

## 2022-04-06 PROCEDURE — G8417 CALC BMI ABV UP PARAM F/U: HCPCS | Performed by: FAMILY MEDICINE

## 2022-04-06 RX ORDER — SIMVASTATIN 40 MG
TABLET ORAL
Qty: 90 TABLET | Refills: 3 | Status: SHIPPED | OUTPATIENT
Start: 2022-04-06

## 2022-04-06 RX ORDER — OMEPRAZOLE 20 MG/1
20 CAPSULE, DELAYED RELEASE ORAL DAILY
Qty: 90 CAPSULE | Refills: 1 | Status: SHIPPED | OUTPATIENT
Start: 2022-04-06 | End: 2022-10-27

## 2022-04-06 ASSESSMENT — ENCOUNTER SYMPTOMS
EYES NEGATIVE: 1
ALLERGIC/IMMUNOLOGIC NEGATIVE: 1
GASTROINTESTINAL NEGATIVE: 1
BACK PAIN: 0
RESPIRATORY NEGATIVE: 1

## 2022-04-06 NOTE — PROGRESS NOTES
Subjective:      Patient ID: Nidhi Lezama is a 78 y.o. female. Gastroesophageal Reflux    Hyperlipidemia    Hand Pain         Routine follow up on chronic medical conditions, refills, and review of updated labs. I have reviewed the patient's medical history in detail and updated the computerized patient record. ultrasound views of the right breast revealed a complicated cyst.  No solid mass. She  Had excisional biopsy of the mass 10/3/18, benign pathology per patient report. Rare gerd symptoms. She relates symptoms after forgetting her omeprazole one day. Nothing persistent. Feeling well overall. Some joint pain in the fingers/hands. She notices making her bed in the morning harder. Fingers seem stiff. Harder to nicola. No interval falls. She had 2 accidental trips/falls in the past.  Wrist fracture. No recurrent falls. She reports tripping over unseen obstacles. Feeling well overall with no interval illness or injury of concern. Past Medical History:   Diagnosis Date    GERD (gastroesophageal reflux disease)     High cholesterol     Hormone replacement therapy     Postsurgical menopause 2003    Age 49     Current Outpatient Medications   Medication Sig Dispense Refill    OMEPRAZOLE PO Take 20 mg by mouth daily      simvastatin (ZOCOR) 40 MG tablet TAKE 1 TABLET NIGHTLY 90 tablet 3    vitamin B-12 (CYANOCOBALAMIN) 1000 MCG tablet Take 1,000 mcg by mouth daily      Cholecalciferol (VITAMIN D3) 2000 units CAPS Take by mouth      Ascorbic Acid (VITAMIN C) 500 MG tablet Take 500 mg by mouth daily      Multiple Vitamins-Minerals (MULTIVITAMIN PO) Take 1 capsule by mouth daily      Naproxen Sodium (ALEVE) 220 MG CAPS Take by mouth as needed for Pain       No current facility-administered medications for this visit.      Allergies   Allergen Reactions    Lipitor [Atorvastatin] Other (See Comments)     Myalgias        Percocet [Oxycodone-Acetaminophen] Nausea Only    Vicodin [Hydrocodone-Acetaminophen] Nausea Only       Review of Systems   Constitutional: Negative. HENT: Negative. Eyes: Negative. Respiratory: Negative. Cardiovascular: Negative. Gastrointestinal: Negative. Genitourinary: Negative. Musculoskeletal: Positive for arthralgias (hands). Negative for back pain. Skin: Negative. Allergic/Immunologic: Negative. Neurological: Negative. Hematological: Negative. Psychiatric/Behavioral: Negative. Objective:   Physical Exam  Constitutional:       General: She is not in acute distress. Appearance: She is well-developed. HENT:      Head: Normocephalic and atraumatic. Right Ear: External ear normal.      Left Ear: External ear normal.      Nose: Nose normal.      Mouth/Throat:      Pharynx: No oropharyngeal exudate. Eyes:      Conjunctiva/sclera: Conjunctivae normal.   Neck:      Thyroid: No thyromegaly. Cardiovascular:      Rate and Rhythm: Normal rate and regular rhythm. Heart sounds: Normal heart sounds. No murmur heard. Pulmonary:      Effort: Pulmonary effort is normal. No respiratory distress. Breath sounds: No wheezing. Abdominal:      General: Bowel sounds are normal. There is no distension. Palpations: Abdomen is soft. Tenderness: There is no abdominal tenderness. Musculoskeletal:         General: No tenderness. Right hand: Deformity (slight enlargement of right index mcp, otherwise no significant deformity or inflammatory changes) present. Cervical back: Neck supple. Lymphadenopathy:      Cervical: No cervical adenopathy. Skin:     General: Skin is warm and dry. Findings: No erythema or rash. Neurological:      Mental Status: She is alert and oriented to person, place, and time. Psychiatric:         Behavior: Behavior normal.         Thought Content:  Thought content normal.         Judgment: Judgment normal.       /60 (Site: Left Upper Arm, Position: 0. 24 0.00 - 0.44 k/uL    Basophils Absolute 0.04 0.00 - 0.20 k/uL    Absolute Immature Granulocyte <0.03 0.00 - 0.30 k/uL       Assessment:       Encounter Diagnoses   Name Primary?  Gastroesophageal reflux disease without esophagitis Yes    Hormone replacement therapy     Venous insufficiency     Pure hypercholesterolemia     TSH elevation     Primary osteoarthritis of both hands            Plan:      gerd: quiescent at present on daily omeprazole. Recurrent symptoms with taper at qod dosing, so she went back to daily dosing. Cont. Same. HRT:  S/p Dipak and bso. Stopped after breast lump issue. Off hrt at present. Pap/pelvic/mammograms up to date. .  Some concerns for a complex cyst in the lower right breast.   excisional biopsy due to complex appearance of the cyst completed 10/3/19  BREAST TISSUES, EXCISION (RIGHT):       - COLLAPSED CYST WITH MURAL ORGANIZING ACTIVE INFLAMMATION AND       FIBROSIS.     - SMALL DUCTAL PAPILLOMA.       - RARE FOCI OF ATYPICAL DUCTAL HYPERPLASIA. Follow up mammogram 9/1/20 normal, mammogram 9/30/21:normal        DEXA scan normal 12/11/18. Cont. Calcium and vitamin D     Hyperlipidemia: improved/stable , on simvastatin .  ldl at 114  . Mild venous insufficiency by report. Variable onset, not daily at present. Discussed management options. Observing at present. Improved at present. Mild tsh elevation with work labs at 5.910 (.45-4.5), follow up with our labs normal as above. May screen again at follow up as t4 at lower limits of normal range. Noticing more arthritic changes in joints of the hands. No inflammatory symptoms on review. Discussed low dose nsaid prn .      ekg prior to breast biopsy. Some bradycardia and t wave inversion in inferior/lateral leads. She had a negative stress test.  Reassurance. Cont. Risk factor modifications. Received both doses of covid vaccination and booster.

## 2022-04-06 NOTE — TELEPHONE ENCOUNTER
Vincent Staton called requesting a refill of the below medication which has been pended for you:     Requested Prescriptions     Pending Prescriptions Disp Refills    omeprazole (PRILOSEC) 20 MG delayed release capsule 90 capsule 1     Sig: Take 1 capsule by mouth daily       Last Appointment Date: 4/6/2022  Next Appointment Date: 10/7/2022    Allergies   Allergen Reactions    Lipitor [Atorvastatin] Other (See Comments)     Myalgias        Percocet [Oxycodone-Acetaminophen] Nausea Only    Vicodin [Hydrocodone-Acetaminophen] Nausea Only

## 2022-04-06 NOTE — PATIENT INSTRUCTIONS
Hospital Outpatient Visit on 04/02/2022   Component Date Value Ref Range Status    Thyroxine, Free 04/02/2022 1.10  0.93 - 1.70 ng/dL Final    TSH 04/02/2022 3.02  0.30 - 5.00 uIU/mL Final    Cholesterol, Fasting 04/02/2022 194  <200 mg/dL Final    Comment:    Cholesterol Guidelines:      <200  Desirable   200-240  Borderline      >240  Undesirable         HDL 04/02/2022 55  >40 mg/dL Final    Comment:    HDL Guidelines:    <40     Undesirable   40-59    Borderline    >59     Desirable         LDL Cholesterol 04/02/2022 114  0 - 130 mg/dL Final    Comment:    LDL Guidelines:     <100    Desirable   100-129   Near to/above Desirable   130-159   Borderline      >159   Undesirable     Direct (measured) LDL and calculated LDL are not interchangeable tests.  Chol/HDL Ratio 04/02/2022 3.5  <5 Final            Triglyceride, Fasting 04/02/2022 126  <150 mg/dL Final    Comment:    Triglyceride Guidelines:     <150   Desirable   150-199  Borderline   200-499  High     >499   Very high   Based on AHA Guidelines for fasting triglyceride, October 2012.          Glucose 04/02/2022 87  70 - 99 mg/dL Final    BUN 04/02/2022 13  8 - 23 mg/dL Final    CREATININE 04/02/2022 0.68  0.50 - 0.90 mg/dL Final    Bun/Cre Ratio 04/02/2022 19  9 - 20 Final    Calcium 04/02/2022 9.8  8.6 - 10.4 mg/dL Final    Sodium 04/02/2022 142  135 - 144 mmol/L Final    Potassium 04/02/2022 4.1  3.7 - 5.3 mmol/L Final    Chloride 04/02/2022 103  98 - 107 mmol/L Final    CO2 04/02/2022 32* 20 - 31 mmol/L Final    Anion Gap 04/02/2022 7* 9 - 17 mmol/L Final    Alkaline Phosphatase 04/02/2022 111* 35 - 104 U/L Final    ALT 04/02/2022 14  5 - 33 U/L Final    AST 04/02/2022 16  <32 U/L Final    Total Bilirubin 04/02/2022 0.44  0.3 - 1.2 mg/dL Final    Total Protein 04/02/2022 6.8  6.4 - 8.3 g/dL Final    Albumin 04/02/2022 4.4  3.5 - 5.2 g/dL Final    Albumin/Globulin Ratio 04/02/2022 1.8  1.0 - 2.5 Final    GFR Non- 04/02/2022 >60  >60 mL/min Final    GFR  04/02/2022 >60  >60 mL/min Final    GFR Comment 04/02/2022        Final    Comment: Average GFR for 61-76 years old:   80 mL/min/1.73sq m  Chronic Kidney Disease:   <60 mL/min/1.73sq m  Kidney failure:   <15 mL/min/1.73sq m              eGFR calculated using average adult body mass.  Additional eGFR calculator available at:        tracx.[email or web address]            WBC 04/02/2022 4.9  3.5 - 11.3 k/uL Final    RBC 04/02/2022 4.58  3.95 - 5.11 m/uL Final    Hemoglobin 04/02/2022 13.9  11.9 - 15.1 g/dL Final    Hematocrit 04/02/2022 42.8  36.3 - 47.1 % Final    MCV 04/02/2022 93.4  82.6 - 102.9 fL Final    MCH 04/02/2022 30.3  25.2 - 33.5 pg Final    MCHC 04/02/2022 32.5  25.2 - 33.5 g/dL Final    RDW 04/02/2022 13.0  11.8 - 14.4 % Final    Platelets 23/74/2202 246  138 - 453 k/uL Final    MPV 04/02/2022 9.5  8.1 - 13.5 fL Final    NRBC Automated 04/02/2022 0.0  0.0 per 100 WBC Final    Seg Neutrophils 04/02/2022 49  36 - 65 % Final    Lymphocytes 04/02/2022 34  24 - 43 % Final    Monocytes 04/02/2022 11  3 - 12 % Final    Eosinophils % 04/02/2022 5* 1 - 4 % Final    Basophils 04/02/2022 1  0 - 2 % Final    Immature Granulocytes 04/02/2022 0  0 % Final    Segs Absolute 04/02/2022 2.43  1.50 - 8.10 k/uL Final    Absolute Lymph # 04/02/2022 1.68  1.10 - 3.70 k/uL Final    Absolute Mono # 04/02/2022 0.53  0.10 - 1.20 k/uL Final    Absolute Eos # 04/02/2022 0.24  0.00 - 0.44 k/uL Final    Basophils Absolute 04/02/2022 0.04  0.00 - 0.20 k/uL Final    Absolute Immature Granulocyte 04/02/2022 <0.03  0.00 - 0.30 k/uL Final

## 2022-09-14 ENCOUNTER — TELEPHONE (OUTPATIENT)
Dept: OBGYN | Age: 67
End: 2022-09-14

## 2022-10-01 ENCOUNTER — HOSPITAL ENCOUNTER (OUTPATIENT)
Dept: LAB | Age: 67
Discharge: HOME | End: 2022-10-01
Payer: MEDICARE

## 2022-10-01 DIAGNOSIS — K21.9 GASTROESOPHAGEAL REFLUX DISEASE WITHOUT ESOPHAGITIS: ICD-10-CM

## 2022-10-01 DIAGNOSIS — E78.00 PURE HYPERCHOLESTEROLEMIA: ICD-10-CM

## 2022-10-01 DIAGNOSIS — E03.8 OTHER SPECIFIED HYPOTHYROIDISM: ICD-10-CM

## 2022-10-01 DIAGNOSIS — E07.9 DISORDER OF THYROID, UNSPECIFIED: ICD-10-CM

## 2022-10-01 DIAGNOSIS — R79.89 TSH ELEVATION: ICD-10-CM

## 2022-10-01 LAB
ABSOLUTE EOS #: 0.17 K/UL (ref 0–0.44)
ABSOLUTE IMMATURE GRANULOCYTE: <0.03 K/UL (ref 0–0.3)
ABSOLUTE LYMPH #: 1.75 K/UL (ref 1.1–3.7)
ABSOLUTE MONO #: 0.44 K/UL (ref 0.1–1.2)
ALBUMIN SERPL-MCNC: 4.7 G/DL (ref 3.5–5.2)
ALBUMIN/GLOBULIN RATIO: 2 (ref 1–2.5)
ALP BLD-CCNC: 105 U/L (ref 35–104)
ALT SERPL-CCNC: 10 U/L (ref 5–33)
ANION GAP SERPL CALCULATED.3IONS-SCNC: 8 MMOL/L (ref 9–17)
AST SERPL-CCNC: 17 U/L
BASOPHILS # BLD: 1 % (ref 0–2)
BASOPHILS ABSOLUTE: 0.04 K/UL (ref 0–0.2)
BILIRUB SERPL-MCNC: 0.5 MG/DL (ref 0.3–1.2)
BUN BLDV-MCNC: 10 MG/DL (ref 8–23)
BUN/CREAT BLD: 13 (ref 9–20)
CALCIUM SERPL-MCNC: 10.4 MG/DL (ref 8.6–10.4)
CHLORIDE BLD-SCNC: 103 MMOL/L (ref 98–107)
CHOLESTEROL/HDL RATIO: 3.6
CHOLESTEROL: 200 MG/DL
CO2: 31 MMOL/L (ref 20–31)
CREAT SERPL-MCNC: 0.79 MG/DL (ref 0.5–0.9)
EOSINOPHILS RELATIVE PERCENT: 4 % (ref 1–4)
GFR AFRICAN AMERICAN: >60 ML/MIN
GFR NON-AFRICAN AMERICAN: >60 ML/MIN
GFR SERPL CREATININE-BSD FRML MDRD: ABNORMAL ML/MIN/{1.73_M2}
GLUCOSE BLD-MCNC: 92 MG/DL (ref 70–99)
HCT VFR BLD CALC: 46 % (ref 36.3–47.1)
HDLC SERPL-MCNC: 56 MG/DL
HEMOGLOBIN: 15.2 G/DL (ref 11.9–15.1)
IMMATURE GRANULOCYTES: 0 %
LDL CHOLESTEROL: 117 MG/DL (ref 0–130)
LYMPHOCYTES # BLD: 37 % (ref 24–43)
MCH RBC QN AUTO: 30.3 PG (ref 25.2–33.5)
MCHC RBC AUTO-ENTMCNC: 33 G/DL (ref 25.2–33.5)
MCV RBC AUTO: 91.6 FL (ref 82.6–102.9)
MONOCYTES # BLD: 9 % (ref 3–12)
NRBC AUTOMATED: 0 PER 100 WBC
PDW BLD-RTO: 12.6 % (ref 11.8–14.4)
PLATELET # BLD: 268 K/UL (ref 138–453)
PMV BLD AUTO: 9.7 FL (ref 8.1–13.5)
POTASSIUM SERPL-SCNC: 4.1 MMOL/L (ref 3.7–5.3)
RBC # BLD: 5.02 M/UL (ref 3.95–5.11)
SEG NEUTROPHILS: 49 % (ref 36–65)
SEGMENTED NEUTROPHILS ABSOLUTE COUNT: 2.35 K/UL (ref 1.5–8.1)
SODIUM BLD-SCNC: 142 MMOL/L (ref 135–144)
THYROXINE, FREE: 1.09 NG/DL (ref 0.93–1.7)
TOTAL PROTEIN: 7 G/DL (ref 6.4–8.3)
TRIGL SERPL-MCNC: 134 MG/DL
TSH SERPL DL<=0.05 MIU/L-ACNC: 3.89 UIU/ML (ref 0.3–5)
WBC # BLD: 4.8 K/UL (ref 3.5–11.3)

## 2022-10-01 PROCEDURE — 36415 COLL VENOUS BLD VENIPUNCTURE: CPT

## 2022-10-01 PROCEDURE — 85025 COMPLETE CBC W/AUTO DIFF WBC: CPT

## 2022-10-01 PROCEDURE — 84443 ASSAY THYROID STIM HORMONE: CPT

## 2022-10-01 PROCEDURE — 84439 ASSAY OF FREE THYROXINE: CPT

## 2022-10-01 PROCEDURE — 80061 LIPID PANEL: CPT

## 2022-10-01 PROCEDURE — 80053 COMPREHEN METABOLIC PANEL: CPT

## 2022-10-20 ENCOUNTER — HOSPITAL ENCOUNTER (OUTPATIENT)
Dept: MAMMOGRAPHY | Age: 67
Discharge: HOME | End: 2022-10-22
Payer: MEDICARE

## 2022-10-20 DIAGNOSIS — Z12.31 ENCOUNTER FOR SCREENING MAMMOGRAM FOR MALIGNANT NEOPLASM OF BREAST: ICD-10-CM

## 2022-10-20 PROCEDURE — 77063 BREAST TOMOSYNTHESIS BI: CPT

## 2022-10-27 RX ORDER — OMEPRAZOLE 20 MG/1
CAPSULE, DELAYED RELEASE ORAL
Qty: 30 CAPSULE | Refills: 0 | Status: SHIPPED | OUTPATIENT
Start: 2022-10-27 | End: 2022-11-08 | Stop reason: SDUPTHER

## 2022-10-27 NOTE — TELEPHONE ENCOUNTER
Melba Mayen called requesting a refill of the below medication which has been pended for you:       Dr. Lillie Blunt is out of the office  Requested Prescriptions     Pending Prescriptions Disp Refills    omeprazole (PRILOSEC) 20 MG delayed release capsule [Pharmacy Med Name: Omeprazole 20 MG Oral Capsule Delayed Release] 30 capsule 0     Sig: Take 1 capsule by mouth once daily       Last Appointment Date: 4/6/2022  Next Appointment Date: 11/8/2022    Allergies   Allergen Reactions    Lipitor [Atorvastatin] Other (See Comments)     Myalgias        Percocet [Oxycodone-Acetaminophen] Nausea Only    Vicodin [Hydrocodone-Acetaminophen] Nausea Only

## 2022-11-08 ENCOUNTER — OFFICE VISIT (OUTPATIENT)
Dept: FAMILY MEDICINE CLINIC | Age: 67
End: 2022-11-08
Payer: MEDICARE

## 2022-11-08 VITALS
DIASTOLIC BLOOD PRESSURE: 70 MMHG | WEIGHT: 137 LBS | HEIGHT: 63 IN | BODY MASS INDEX: 24.27 KG/M2 | HEART RATE: 72 BPM | SYSTOLIC BLOOD PRESSURE: 126 MMHG

## 2022-11-08 DIAGNOSIS — M19.042 PRIMARY OSTEOARTHRITIS OF BOTH HANDS: ICD-10-CM

## 2022-11-08 DIAGNOSIS — E78.00 PURE HYPERCHOLESTEROLEMIA: ICD-10-CM

## 2022-11-08 DIAGNOSIS — M19.041 PRIMARY OSTEOARTHRITIS OF BOTH HANDS: ICD-10-CM

## 2022-11-08 DIAGNOSIS — K21.9 GASTROESOPHAGEAL REFLUX DISEASE WITHOUT ESOPHAGITIS: Primary | ICD-10-CM

## 2022-11-08 DIAGNOSIS — I87.2 VENOUS INSUFFICIENCY: ICD-10-CM

## 2022-11-08 DIAGNOSIS — R79.89 TSH ELEVATION: ICD-10-CM

## 2022-11-08 DIAGNOSIS — Z79.890 HORMONE REPLACEMENT THERAPY: ICD-10-CM

## 2022-11-08 PROCEDURE — G8484 FLU IMMUNIZE NO ADMIN: HCPCS | Performed by: FAMILY MEDICINE

## 2022-11-08 PROCEDURE — 99213 OFFICE O/P EST LOW 20 MIN: CPT | Performed by: FAMILY MEDICINE

## 2022-11-08 PROCEDURE — 1036F TOBACCO NON-USER: CPT | Performed by: FAMILY MEDICINE

## 2022-11-08 PROCEDURE — G8427 DOCREV CUR MEDS BY ELIG CLIN: HCPCS | Performed by: FAMILY MEDICINE

## 2022-11-08 PROCEDURE — G8420 CALC BMI NORM PARAMETERS: HCPCS | Performed by: FAMILY MEDICINE

## 2022-11-08 PROCEDURE — G8400 PT W/DXA NO RESULTS DOC: HCPCS | Performed by: FAMILY MEDICINE

## 2022-11-08 PROCEDURE — 3017F COLORECTAL CA SCREEN DOC REV: CPT | Performed by: FAMILY MEDICINE

## 2022-11-08 PROCEDURE — 99214 OFFICE O/P EST MOD 30 MIN: CPT | Performed by: FAMILY MEDICINE

## 2022-11-08 PROCEDURE — 1090F PRES/ABSN URINE INCON ASSESS: CPT | Performed by: FAMILY MEDICINE

## 2022-11-08 PROCEDURE — 1123F ACP DISCUSS/DSCN MKR DOCD: CPT | Performed by: FAMILY MEDICINE

## 2022-11-08 RX ORDER — OMEPRAZOLE 20 MG/1
CAPSULE, DELAYED RELEASE ORAL
Qty: 90 CAPSULE | Refills: 3 | Status: SHIPPED | OUTPATIENT
Start: 2022-11-08

## 2022-11-08 SDOH — ECONOMIC STABILITY: FOOD INSECURITY: WITHIN THE PAST 12 MONTHS, THE FOOD YOU BOUGHT JUST DIDN'T LAST AND YOU DIDN'T HAVE MONEY TO GET MORE.: NEVER TRUE

## 2022-11-08 SDOH — ECONOMIC STABILITY: FOOD INSECURITY: WITHIN THE PAST 12 MONTHS, YOU WORRIED THAT YOUR FOOD WOULD RUN OUT BEFORE YOU GOT MONEY TO BUY MORE.: NEVER TRUE

## 2022-11-08 ASSESSMENT — PATIENT HEALTH QUESTIONNAIRE - PHQ9
SUM OF ALL RESPONSES TO PHQ QUESTIONS 1-9: 0
SUM OF ALL RESPONSES TO PHQ9 QUESTIONS 1 & 2: 0
1. LITTLE INTEREST OR PLEASURE IN DOING THINGS: 0
2. FEELING DOWN, DEPRESSED OR HOPELESS: 0

## 2022-11-08 ASSESSMENT — ENCOUNTER SYMPTOMS
RESPIRATORY NEGATIVE: 1
BACK PAIN: 0
GASTROINTESTINAL NEGATIVE: 1
ALLERGIC/IMMUNOLOGIC NEGATIVE: 1
EYES NEGATIVE: 1

## 2022-11-08 ASSESSMENT — SOCIAL DETERMINANTS OF HEALTH (SDOH): HOW HARD IS IT FOR YOU TO PAY FOR THE VERY BASICS LIKE FOOD, HOUSING, MEDICAL CARE, AND HEATING?: NOT HARD AT ALL

## 2022-11-08 NOTE — PATIENT INSTRUCTIONS
Hospital Outpatient Visit on 10/01/2022   Component Date Value Ref Range Status    Thyroxine, Free 10/01/2022 1.09  0.93 - 1.70 ng/dL Final    TSH 10/01/2022 3.89  0.30 - 5.00 uIU/mL Final    WBC 10/01/2022 4.8  3.5 - 11.3 k/uL Final    RBC 10/01/2022 5.02  3.95 - 5.11 m/uL Final    Hemoglobin 10/01/2022 15.2 (A)  11.9 - 15.1 g/dL Final    Hematocrit 10/01/2022 46.0  36.3 - 47.1 % Final    MCV 10/01/2022 91.6  82.6 - 102.9 fL Final    MCH 10/01/2022 30.3  25.2 - 33.5 pg Final    MCHC 10/01/2022 33.0  25.2 - 33.5 g/dL Final    RDW 10/01/2022 12.6  11.8 - 14.4 % Final    Platelets 98/85/5956 268  138 - 453 k/uL Final    MPV 10/01/2022 9.7  8.1 - 13.5 fL Final    NRBC Automated 10/01/2022 0.0  0.0 per 100 WBC Final    Seg Neutrophils 10/01/2022 49  36 - 65 % Final    Lymphocytes 10/01/2022 37  24 - 43 % Final    Monocytes 10/01/2022 9  3 - 12 % Final    Eosinophils % 10/01/2022 4  1 - 4 % Final    Basophils 10/01/2022 1  0 - 2 % Final    Immature Granulocytes 10/01/2022 0  0 % Final    Segs Absolute 10/01/2022 2.35  1.50 - 8.10 k/uL Final    Absolute Lymph # 10/01/2022 1.75  1.10 - 3.70 k/uL Final    Absolute Mono # 10/01/2022 0.44  0.10 - 1.20 k/uL Final    Absolute Eos # 10/01/2022 0.17  0.00 - 0.44 k/uL Final    Basophils Absolute 10/01/2022 0.04  0.00 - 0.20 k/uL Final    Absolute Immature Granulocyte 10/01/2022 <0.03  0.00 - 0.30 k/uL Final    Glucose 10/01/2022 92  70 - 99 mg/dL Final    BUN 10/01/2022 10  8 - 23 mg/dL Final    Creatinine 10/01/2022 0.79  0.50 - 0.90 mg/dL Final    Bun/Cre Ratio 10/01/2022 13  9 - 20 Final    Calcium 10/01/2022 10.4  8.6 - 10.4 mg/dL Final    Sodium 10/01/2022 142  135 - 144 mmol/L Final    Potassium 10/01/2022 4.1  3.7 - 5.3 mmol/L Final    Chloride 10/01/2022 103  98 - 107 mmol/L Final    CO2 10/01/2022 31  20 - 31 mmol/L Final    Anion Gap 10/01/2022 8 (A)  9 - 17 mmol/L Final    Alkaline Phosphatase 10/01/2022 105 (A)  35 - 104 U/L Final    ALT 10/01/2022 10  5 - 33 U/L Final AST 10/01/2022 17  <32 U/L Final    Total Bilirubin 10/01/2022 0.5  0.3 - 1.2 mg/dL Final    Total Protein 10/01/2022 7.0  6.4 - 8.3 g/dL Final    Albumin 10/01/2022 4.7  3.5 - 5.2 g/dL Final    Albumin/Globulin Ratio 10/01/2022 2.0  1.0 - 2.5 Final    GFR Non- 10/01/2022 >60  >60 mL/min Final    GFR  10/01/2022 >60  >60 mL/min Final    GFR Comment 10/01/2022        Final    Comment: Average GFR for 61-76 years old:   80 mL/min/1.73sq m  Chronic Kidney Disease:   <60 mL/min/1.73sq m  Kidney failure:   <15 mL/min/1.73sq m              eGFR calculated using average adult body mass. Additional eGFR calculator available at:        [employer]            Cholesterol 10/01/2022 200 (A)  <200 mg/dL Final    Comment:    Cholesterol Guidelines:      <200  Desirable   200-240  Borderline      >240  Undesirable         HDL 10/01/2022 56  >40 mg/dL Final    Comment:    HDL Guidelines:    <40     Undesirable   40-59    Borderline    >59     Desirable         LDL Cholesterol 10/01/2022 117  0 - 130 mg/dL Final    Comment:    LDL Guidelines:     <100    Desirable   100-129   Near to/above Desirable   130-159   Borderline      >159   Undesirable     Direct (measured) LDL and calculated LDL are not interchangeable tests. Chol/HDL Ratio 10/01/2022 3.6  <5 Final            Triglycerides 10/01/2022 134  <150 mg/dL Final    Comment:    Triglyceride Guidelines:     <150   Desirable   150-199  Borderline   200-499  High     >499   Very high   Based on AHA Guidelines for fasting triglyceride, October 2012.

## 2022-11-08 NOTE — PROGRESS NOTES
Subjective:      Patient ID: Arpit Fernandez is a 75 y.o. female. Gastroesophageal Reflux    Hyperlipidemia    Hand Pain     Other  Associated symptoms include arthralgias (hands). Routine follow up on chronic medical conditions, refills, and review of updated labs. I have reviewed the patient's medical history in detail and updated the computerized patient record. ultrasound views of the right breast revealed a complicated cyst.  No solid mass. She  Had excisional biopsy of the mass 10/3/18, benign pathology per patient report. Rare gerd symptoms. She relates symptoms after forgetting her omeprazole one day. Nothing persistent. Feeling well overall. Some joint pain in the fingers/hands. She notices making her bed in the morning harder. Fingers seem stiff. Harder to nicola. No interval falls. She had 2 accidental trips/falls in the past.  Wrist fracture. No recurrent falls. She reports tripping over unseen obstacles. Feeling well overall with no interval illness or injury of concern. Past Medical History:   Diagnosis Date    GERD (gastroesophageal reflux disease)     High cholesterol     Hormone replacement therapy     Postsurgical menopause 2003    Age 49     Current Outpatient Medications   Medication Sig Dispense Refill    omeprazole (PRILOSEC) 20 MG delayed release capsule Take 1 capsule by mouth once daily 30 capsule 0    simvastatin (ZOCOR) 40 MG tablet TAKE 1 TABLET NIGHTLY 90 tablet 3    vitamin B-12 (CYANOCOBALAMIN) 1000 MCG tablet Take 1,000 mcg by mouth daily      Cholecalciferol (VITAMIN D3) 2000 units CAPS Take by mouth      Ascorbic Acid (VITAMIN C) 500 MG tablet Take 500 mg by mouth daily      Multiple Vitamins-Minerals (MULTIVITAMIN PO) Take 1 capsule by mouth daily      Naproxen Sodium (ALEVE) 220 MG CAPS Take by mouth as needed for Pain       No current facility-administered medications for this visit.      Allergies   Allergen Reactions    Lipitor [Atorvastatin] Other (See Comments)     Myalgias        Percocet [Oxycodone-Acetaminophen] Nausea Only    Vicodin [Hydrocodone-Acetaminophen] Nausea Only       Review of Systems   Constitutional: Negative. HENT: Negative. Eyes: Negative. Respiratory: Negative. Cardiovascular: Negative. Gastrointestinal: Negative. Genitourinary: Negative. Musculoskeletal:  Positive for arthralgias (hands). Negative for back pain. Skin: Negative. Allergic/Immunologic: Negative. Neurological: Negative. Hematological: Negative. Psychiatric/Behavioral: Negative. Objective:   Physical Exam  Constitutional:       General: She is not in acute distress. Appearance: She is well-developed. HENT:      Head: Normocephalic and atraumatic. Right Ear: External ear normal.      Left Ear: External ear normal.      Nose: Nose normal.      Mouth/Throat:      Pharynx: No oropharyngeal exudate. Eyes:      Conjunctiva/sclera: Conjunctivae normal.   Neck:      Thyroid: No thyromegaly. Cardiovascular:      Rate and Rhythm: Normal rate and regular rhythm. Heart sounds: Normal heart sounds. No murmur heard. Pulmonary:      Effort: Pulmonary effort is normal. No respiratory distress. Breath sounds: No wheezing. Abdominal:      General: Bowel sounds are normal. There is no distension. Palpations: Abdomen is soft. Tenderness: There is no abdominal tenderness. Musculoskeletal:         General: No tenderness. Right hand: Deformity (slight enlargement of right index mcp, otherwise no significant deformity or inflammatory changes) present. Cervical back: Neck supple. Lymphadenopathy:      Cervical: No cervical adenopathy. Skin:     General: Skin is warm and dry. Findings: No erythema or rash. Neurological:      Mental Status: She is alert and oriented to person, place, and time. Psychiatric:         Behavior: Behavior normal.         Thought Content:  Thought content normal.         Judgment: Judgment normal.     /70 (Site: Right Upper Arm, Position: Sitting, Cuff Size: Large Adult)   Pulse 72   Ht 5' 3\" (1.6 m)   Wt 137 lb (62.1 kg)   LMP  (LMP Unknown)   BMI 24.27 kg/m²   Results for orders placed or performed during the hospital encounter of 10/01/22   T4, Free   Result Value Ref Range    Thyroxine, Free 1.09 0.93 - 1.70 ng/dL   TSH   Result Value Ref Range    TSH 3.89 0.30 - 5.00 uIU/mL   CBC with Auto Differential   Result Value Ref Range    WBC 4.8 3.5 - 11.3 k/uL    RBC 5.02 3.95 - 5.11 m/uL    Hemoglobin 15.2 (H) 11.9 - 15.1 g/dL    Hematocrit 46.0 36.3 - 47.1 %    MCV 91.6 82.6 - 102.9 fL    MCH 30.3 25.2 - 33.5 pg    MCHC 33.0 25.2 - 33.5 g/dL    RDW 12.6 11.8 - 14.4 %    Platelets 569 024 - 436 k/uL    MPV 9.7 8.1 - 13.5 fL    NRBC Automated 0.0 0.0 per 100 WBC    Seg Neutrophils 49 36 - 65 %    Lymphocytes 37 24 - 43 %    Monocytes 9 3 - 12 %    Eosinophils % 4 1 - 4 %    Basophils 1 0 - 2 %    Immature Granulocytes 0 0 %    Segs Absolute 2.35 1.50 - 8.10 k/uL    Absolute Lymph # 1.75 1.10 - 3.70 k/uL    Absolute Mono # 0.44 0.10 - 1.20 k/uL    Absolute Eos # 0.17 0.00 - 0.44 k/uL    Basophils Absolute 0.04 0.00 - 0.20 k/uL    Absolute Immature Granulocyte <0.03 0.00 - 0.30 k/uL   Comprehensive Metabolic Panel   Result Value Ref Range    Glucose 92 70 - 99 mg/dL    BUN 10 8 - 23 mg/dL    Creatinine 0.79 0.50 - 0.90 mg/dL    Bun/Cre Ratio 13 9 - 20    Calcium 10.4 8.6 - 10.4 mg/dL    Sodium 142 135 - 144 mmol/L    Potassium 4.1 3.7 - 5.3 mmol/L    Chloride 103 98 - 107 mmol/L    CO2 31 20 - 31 mmol/L    Anion Gap 8 (L) 9 - 17 mmol/L    Alkaline Phosphatase 105 (H) 35 - 104 U/L    ALT 10 5 - 33 U/L    AST 17 <32 U/L    Total Bilirubin 0.5 0.3 - 1.2 mg/dL    Total Protein 7.0 6.4 - 8.3 g/dL    Albumin 4.7 3.5 - 5.2 g/dL    Albumin/Globulin Ratio 2.0 1.0 - 2.5    GFR Non-African American >60 >60 mL/min    GFR African American >60 >60 mL/min    GFR Comment         Lipid Panel   Result Value Ref Range    Cholesterol 200 (H) <200 mg/dL    HDL 56 >40 mg/dL    LDL Cholesterol 117 0 - 130 mg/dL    Chol/HDL Ratio 3.6 <5    Triglycerides 134 <150 mg/dL       Assessment:       Encounter Diagnoses   Name Primary? Gastroesophageal reflux disease without esophagitis Yes    Hormone replacement therapy     Pure hypercholesterolemia     Venous insufficiency     TSH elevation     Primary osteoarthritis of both hands        Plan:      gerd: quiescent at present on daily omeprazole. Recurrent symptoms with taper at qod dosing, so she went back to daily dosing. Cont. Same. HRT:  S/p Dipak and bso. Stopped after breast lump issue. Off hrt at present. Pap/pelvic/mammograms up to date. .  Some concerns for a complex cyst in the lower right breast.   excisional biopsy due to complex appearance of the cyst completed 10/3/19  BREAST TISSUES, EXCISION (RIGHT):       - COLLAPSED CYST WITH MURAL ORGANIZING ACTIVE INFLAMMATION AND       FIBROSIS.       - SMALL DUCTAL PAPILLOMA. - RARE FOCI OF ATYPICAL DUCTAL HYPERPLASIA. Follow up mammogram 9/1/20 normal, mammogram 9/30/21:normal , 10/20/22 normal.  Cont. annual       DEXA scan normal 12/11/18. Cont. Calcium and vitamin D     Hyperlipidemia: improved/stable , on simvastatin .  ldl at 117 . Mild venous insufficiency by report. Variable onset, not daily at present. Discussed management options. Observing at present. Improved at present. Mild tsh elevation with work labs at 5.910 (.45-4.5), follow up with our labs normal as above. May screen again at follow up as t4 at lower limits of normal range. Normal ranges at present. Observation. Noticing more arthritic changes in joints of the hands. No inflammatory symptoms on review. Discussed low dose nsaid prn . Seems stable at present.     ekg prior to breast biopsy. Some bradycardia and t wave inversion in inferior/lateral leads.   She had a negative stress test.  Reassurance. Cont. Risk factor modifications. Received both doses of covid vaccination and booster.

## 2022-11-10 ENCOUNTER — TELEPHONE (OUTPATIENT)
Dept: OBGYN | Age: 67
End: 2022-11-10

## 2023-02-21 RX ORDER — SIMVASTATIN 40 MG
TABLET ORAL
Qty: 90 TABLET | Refills: 0 | Status: SHIPPED | OUTPATIENT
Start: 2023-02-21

## 2023-05-06 ENCOUNTER — HOSPITAL ENCOUNTER (OUTPATIENT)
Age: 68
Discharge: HOME | End: 2023-05-06
Payer: MEDICARE

## 2023-05-06 DIAGNOSIS — K21.9 GASTROESOPHAGEAL REFLUX DISEASE WITHOUT ESOPHAGITIS: ICD-10-CM

## 2023-05-06 DIAGNOSIS — E78.00 PURE HYPERCHOLESTEROLEMIA: ICD-10-CM

## 2023-05-06 LAB
ABSOLUTE EOS #: 0.22 K/UL (ref 0–0.44)
ABSOLUTE IMMATURE GRANULOCYTE: <0.03 K/UL (ref 0–0.3)
ABSOLUTE LYMPH #: 1.77 K/UL (ref 1.1–3.7)
ABSOLUTE MONO #: 0.44 K/UL (ref 0.1–1.2)
ALBUMIN SERPL-MCNC: 4.1 G/DL (ref 3.5–5.2)
ALBUMIN/GLOBULIN RATIO: 1.4 (ref 1–2.5)
ALP SERPL-CCNC: 127 U/L (ref 35–104)
ALT SERPL-CCNC: 19 U/L (ref 5–33)
ANION GAP SERPL CALCULATED.3IONS-SCNC: 9 MMOL/L (ref 9–17)
AST SERPL-CCNC: 24 U/L
BASOPHILS # BLD: 1 % (ref 0–2)
BASOPHILS ABSOLUTE: 0.04 K/UL (ref 0–0.2)
BILIRUB SERPL-MCNC: 0.3 MG/DL (ref 0.3–1.2)
BUN SERPL-MCNC: 15 MG/DL (ref 8–23)
BUN/CREAT BLD: 22 (ref 9–20)
CALCIUM SERPL-MCNC: 10.2 MG/DL (ref 8.6–10.4)
CHLORIDE SERPL-SCNC: 106 MMOL/L (ref 98–107)
CHOLEST SERPL-MCNC: 176 MG/DL
CHOLESTEROL/HDL RATIO: 3.1
CO2 SERPL-SCNC: 30 MMOL/L (ref 20–31)
CREAT SERPL-MCNC: 0.68 MG/DL (ref 0.5–0.9)
EOSINOPHILS RELATIVE PERCENT: 4 % (ref 1–4)
GFR SERPL CREATININE-BSD FRML MDRD: >60 ML/MIN/1.73M2
GLUCOSE SERPL-MCNC: 95 MG/DL (ref 70–99)
HCT VFR BLD AUTO: 42.3 % (ref 36.3–47.1)
HDLC SERPL-MCNC: 56 MG/DL
HGB BLD-MCNC: 13.7 G/DL (ref 11.9–15.1)
IMMATURE GRANULOCYTES: 0 %
LDLC SERPL CALC-MCNC: 102 MG/DL (ref 0–130)
LYMPHOCYTES # BLD: 35 % (ref 24–43)
MCH RBC QN AUTO: 29.8 PG (ref 25.2–33.5)
MCHC RBC AUTO-ENTMCNC: 32.4 G/DL (ref 25.2–33.5)
MCV RBC AUTO: 92.2 FL (ref 82.6–102.9)
MONOCYTES # BLD: 9 % (ref 3–12)
NRBC AUTOMATED: 0 PER 100 WBC
PDW BLD-RTO: 12.5 % (ref 11.8–14.4)
PLATELET # BLD AUTO: 243 K/UL (ref 138–453)
PMV BLD AUTO: 9.3 FL (ref 8.1–13.5)
POTASSIUM SERPL-SCNC: 4.4 MMOL/L (ref 3.7–5.3)
PROT SERPL-MCNC: 7.1 G/DL (ref 6.4–8.3)
RBC # BLD: 4.59 M/UL (ref 3.95–5.11)
SEG NEUTROPHILS: 51 % (ref 36–65)
SEGMENTED NEUTROPHILS ABSOLUTE COUNT: 2.55 K/UL (ref 1.5–8.1)
SODIUM SERPL-SCNC: 145 MMOL/L (ref 135–144)
TRIGL SERPL-MCNC: 89 MG/DL
WBC # BLD AUTO: 5 K/UL (ref 3.5–11.3)

## 2023-05-06 PROCEDURE — 36415 COLL VENOUS BLD VENIPUNCTURE: CPT

## 2023-05-06 PROCEDURE — 85025 COMPLETE CBC W/AUTO DIFF WBC: CPT

## 2023-05-06 PROCEDURE — 80053 COMPREHEN METABOLIC PANEL: CPT

## 2023-05-06 PROCEDURE — 80061 LIPID PANEL: CPT

## 2023-05-10 ENCOUNTER — OFFICE VISIT (OUTPATIENT)
Dept: FAMILY MEDICINE CLINIC | Age: 68
End: 2023-05-10
Payer: MEDICARE

## 2023-05-10 VITALS
HEART RATE: 51 BPM | DIASTOLIC BLOOD PRESSURE: 68 MMHG | WEIGHT: 145 LBS | BODY MASS INDEX: 24.75 KG/M2 | OXYGEN SATURATION: 96 % | HEIGHT: 64 IN | SYSTOLIC BLOOD PRESSURE: 122 MMHG

## 2023-05-10 DIAGNOSIS — E07.9 DISORDER OF THYROID, UNSPECIFIED: ICD-10-CM

## 2023-05-10 DIAGNOSIS — K21.9 GASTROESOPHAGEAL REFLUX DISEASE WITHOUT ESOPHAGITIS: Primary | ICD-10-CM

## 2023-05-10 DIAGNOSIS — Z79.890 HORMONE REPLACEMENT THERAPY: ICD-10-CM

## 2023-05-10 DIAGNOSIS — R79.89 TSH ELEVATION: ICD-10-CM

## 2023-05-10 DIAGNOSIS — E78.00 PURE HYPERCHOLESTEROLEMIA: ICD-10-CM

## 2023-05-10 DIAGNOSIS — R00.1 BRADYCARDIA: ICD-10-CM

## 2023-05-10 DIAGNOSIS — M19.042 PRIMARY OSTEOARTHRITIS OF BOTH HANDS: ICD-10-CM

## 2023-05-10 DIAGNOSIS — I87.2 VENOUS INSUFFICIENCY: ICD-10-CM

## 2023-05-10 DIAGNOSIS — M19.041 PRIMARY OSTEOARTHRITIS OF BOTH HANDS: ICD-10-CM

## 2023-05-10 PROCEDURE — 1123F ACP DISCUSS/DSCN MKR DOCD: CPT | Performed by: FAMILY MEDICINE

## 2023-05-10 PROCEDURE — G8420 CALC BMI NORM PARAMETERS: HCPCS | Performed by: FAMILY MEDICINE

## 2023-05-10 PROCEDURE — G8427 DOCREV CUR MEDS BY ELIG CLIN: HCPCS | Performed by: FAMILY MEDICINE

## 2023-05-10 PROCEDURE — 99214 OFFICE O/P EST MOD 30 MIN: CPT | Performed by: FAMILY MEDICINE

## 2023-05-10 PROCEDURE — 1036F TOBACCO NON-USER: CPT | Performed by: FAMILY MEDICINE

## 2023-05-10 PROCEDURE — G8400 PT W/DXA NO RESULTS DOC: HCPCS | Performed by: FAMILY MEDICINE

## 2023-05-10 PROCEDURE — 99213 OFFICE O/P EST LOW 20 MIN: CPT | Performed by: FAMILY MEDICINE

## 2023-05-10 PROCEDURE — 3017F COLORECTAL CA SCREEN DOC REV: CPT | Performed by: FAMILY MEDICINE

## 2023-05-10 PROCEDURE — 1090F PRES/ABSN URINE INCON ASSESS: CPT | Performed by: FAMILY MEDICINE

## 2023-05-10 RX ORDER — SIMVASTATIN 40 MG
TABLET ORAL
Qty: 90 TABLET | Refills: 0 | Status: SHIPPED | OUTPATIENT
Start: 2023-05-10

## 2023-05-10 SDOH — ECONOMIC STABILITY: HOUSING INSECURITY
IN THE LAST 12 MONTHS, WAS THERE A TIME WHEN YOU DID NOT HAVE A STEADY PLACE TO SLEEP OR SLEPT IN A SHELTER (INCLUDING NOW)?: NO

## 2023-05-10 SDOH — ECONOMIC STABILITY: INCOME INSECURITY: HOW HARD IS IT FOR YOU TO PAY FOR THE VERY BASICS LIKE FOOD, HOUSING, MEDICAL CARE, AND HEATING?: NOT HARD AT ALL

## 2023-05-10 SDOH — ECONOMIC STABILITY: FOOD INSECURITY: WITHIN THE PAST 12 MONTHS, YOU WORRIED THAT YOUR FOOD WOULD RUN OUT BEFORE YOU GOT MONEY TO BUY MORE.: NEVER TRUE

## 2023-05-10 ASSESSMENT — ENCOUNTER SYMPTOMS
EYES NEGATIVE: 1
RESPIRATORY NEGATIVE: 1
BACK PAIN: 0
ALLERGIC/IMMUNOLOGIC NEGATIVE: 1
GASTROINTESTINAL NEGATIVE: 1

## 2023-05-10 ASSESSMENT — PATIENT HEALTH QUESTIONNAIRE - PHQ9
SUM OF ALL RESPONSES TO PHQ QUESTIONS 1-9: 0
1. LITTLE INTEREST OR PLEASURE IN DOING THINGS: 0
SUM OF ALL RESPONSES TO PHQ9 QUESTIONS 1 & 2: 0
SUM OF ALL RESPONSES TO PHQ QUESTIONS 1-9: 0
SUM OF ALL RESPONSES TO PHQ QUESTIONS 1-9: 0
2. FEELING DOWN, DEPRESSED OR HOPELESS: 0
SUM OF ALL RESPONSES TO PHQ QUESTIONS 1-9: 0

## 2023-05-18 RX ORDER — SIMVASTATIN 40 MG
TABLET ORAL
Qty: 90 TABLET | Refills: 0 | OUTPATIENT
Start: 2023-05-18

## 2023-07-19 ENCOUNTER — OFFICE VISIT (OUTPATIENT)
Dept: CARDIOLOGY | Age: 68
End: 2023-07-19
Payer: MEDICARE

## 2023-07-19 VITALS
HEART RATE: 53 BPM | WEIGHT: 142 LBS | OXYGEN SATURATION: 97 % | HEIGHT: 64 IN | BODY MASS INDEX: 24.24 KG/M2 | SYSTOLIC BLOOD PRESSURE: 134 MMHG | DIASTOLIC BLOOD PRESSURE: 80 MMHG

## 2023-07-19 DIAGNOSIS — E78.5 HYPERLIPIDEMIA, UNSPECIFIED HYPERLIPIDEMIA TYPE: ICD-10-CM

## 2023-07-19 DIAGNOSIS — I25.9 CHEST PAIN DUE TO MYOCARDIAL ISCHEMIA, UNSPECIFIED ISCHEMIC CHEST PAIN TYPE: Primary | ICD-10-CM

## 2023-07-19 PROCEDURE — 1090F PRES/ABSN URINE INCON ASSESS: CPT | Performed by: INTERNAL MEDICINE

## 2023-07-19 PROCEDURE — 93010 ELECTROCARDIOGRAM REPORT: CPT | Performed by: INTERNAL MEDICINE

## 2023-07-19 PROCEDURE — G8400 PT W/DXA NO RESULTS DOC: HCPCS | Performed by: INTERNAL MEDICINE

## 2023-07-19 PROCEDURE — 99213 OFFICE O/P EST LOW 20 MIN: CPT | Performed by: INTERNAL MEDICINE

## 2023-07-19 PROCEDURE — G8420 CALC BMI NORM PARAMETERS: HCPCS | Performed by: INTERNAL MEDICINE

## 2023-07-19 PROCEDURE — 99203 OFFICE O/P NEW LOW 30 MIN: CPT | Performed by: INTERNAL MEDICINE

## 2023-07-19 PROCEDURE — 93005 ELECTROCARDIOGRAM TRACING: CPT | Performed by: INTERNAL MEDICINE

## 2023-07-19 PROCEDURE — 3017F COLORECTAL CA SCREEN DOC REV: CPT | Performed by: INTERNAL MEDICINE

## 2023-07-19 PROCEDURE — 1036F TOBACCO NON-USER: CPT | Performed by: INTERNAL MEDICINE

## 2023-07-19 PROCEDURE — G8427 DOCREV CUR MEDS BY ELIG CLIN: HCPCS | Performed by: INTERNAL MEDICINE

## 2023-07-19 PROCEDURE — 1123F ACP DISCUSS/DSCN MKR DOCD: CPT | Performed by: INTERNAL MEDICINE

## 2023-07-19 RX ORDER — CLOBETASOL PROPIONATE 0.5 MG/G
CREAM TOPICAL
COMMUNITY
Start: 2022-10-05 | End: 2023-07-19

## 2023-07-19 NOTE — PROGRESS NOTES
Today's Date: 7/19/2023  Patient's Name: Adolfo Dockery  Patient's age: 75 y.o., 1955    Subjective:  Adolfo Dockery is being seen in clinic today regarding   Chief Complaint   Patient presents with    Chest Pain    Establish Cardiologist         she is here to establish cardiology care. She has been having chest pain intermittently for last few months. She reports chest pain across her chest lasting for several minutes. She has noticed this to happen more so in the morning. It can happen with exertion or without exertion. She denies any dyspnea, no PND, no syncope or pre-syncope, no orthopnea. Past Medical History:   has a past medical history of GERD (gastroesophageal reflux disease), High cholesterol, Hormone replacement therapy, and Postsurgical menopause. Past Surgical History:   has a past surgical history that includes Inguinal hernia repair (Right, 01/24/06); Total abdominal hysterectomy w/ bilateral salpingoophorectomy; Tubal ligation; Tonsillectomy; Rotator cuff repair (Right, 2012); Cystocele repair; Colonoscopy (06/14); Rotator cuff repair (Left, 02/14); Carpal tunnel release (Left, 2018); pr exc cyst/aberrant breast tissue open 1/> lesion (Right, 10/3/2018); Breast biopsy (Right); and Hysterectomy. Home Medications:  Prior to Admission medications    Medication Sig Start Date End Date Taking?  Authorizing Provider   Biotin 2500 MCG CHEW Take 2,500 mcg by mouth [employer], skin, nails Brand  Take 2 gummies daily   Yes Historical Provider, MD   simvastatin (ZOCOR) 40 MG tablet Take 1 tablet by mouth nightly 5/10/23  Yes Dago Torres MD   omeprazole (PRILOSEC) 20 MG delayed release capsule Take 1 capsule by mouth once daily 11/8/22  Yes Dago Torres MD   vitamin B-12 (CYANOCOBALAMIN) 1000 MCG tablet Take 1 tablet by mouth daily   Yes Historical Provider, MD   Cholecalciferol (VITAMIN D3) 2000 units CAPS Take by mouth   Yes Historical Provider, MD   Multiple

## 2023-07-19 NOTE — PATIENT INSTRUCTIONS
Please call Antolin wiggins at [phone]   This needs to be done at [facility].

## 2023-08-08 ENCOUNTER — HOSPITAL ENCOUNTER (OUTPATIENT)
Dept: CT IMAGING | Age: 68
Discharge: HOME | End: 2023-08-10
Attending: INTERNAL MEDICINE
Payer: MEDICARE

## 2023-08-08 VITALS — SYSTOLIC BLOOD PRESSURE: 142 MMHG | DIASTOLIC BLOOD PRESSURE: 71 MMHG | HEART RATE: 60 BPM | RESPIRATION RATE: 12 BRPM

## 2023-08-08 DIAGNOSIS — I25.9 CHEST PAIN DUE TO MYOCARDIAL ISCHEMIA, UNSPECIFIED ISCHEMIC CHEST PAIN TYPE: ICD-10-CM

## 2023-08-08 LAB
EGFR, POC: >60 ML/MIN/1.73M2
POC CREATININE: 0.6 MG/DL (ref 0.51–1.19)

## 2023-08-08 PROCEDURE — 6370000000 HC RX 637 (ALT 250 FOR IP): Performed by: INTERNAL MEDICINE

## 2023-08-08 PROCEDURE — 2580000003 HC RX 258: Performed by: INTERNAL MEDICINE

## 2023-08-08 PROCEDURE — 75574 CT ANGIO HRT W/3D IMAGE: CPT

## 2023-08-08 PROCEDURE — 6360000004 HC RX CONTRAST MEDICATION: Performed by: INTERNAL MEDICINE

## 2023-08-08 PROCEDURE — 82565 ASSAY OF CREATININE: CPT

## 2023-08-08 RX ORDER — SODIUM CHLORIDE 0.9 % (FLUSH) 0.9 %
10 SYRINGE (ML) INJECTION PRN
Status: DISCONTINUED | OUTPATIENT
Start: 2023-08-08 | End: 2023-08-11 | Stop reason: HOSPADM

## 2023-08-08 RX ORDER — NITROGLYCERIN 0.4 MG/1
0.4 TABLET SUBLINGUAL ONCE
Status: COMPLETED | OUTPATIENT
Start: 2023-08-08 | End: 2023-08-08

## 2023-08-08 RX ORDER — 0.9 % SODIUM CHLORIDE 0.9 %
100 INTRAVENOUS SOLUTION INTRAVENOUS ONCE
Status: COMPLETED | OUTPATIENT
Start: 2023-08-08 | End: 2023-08-08

## 2023-08-08 RX ADMIN — SODIUM CHLORIDE 100 ML: 9 INJECTION, SOLUTION INTRAVENOUS at 09:10

## 2023-08-08 RX ADMIN — SODIUM CHLORIDE, PRESERVATIVE FREE 10 ML: 5 INJECTION INTRAVENOUS at 09:10

## 2023-08-08 RX ADMIN — NITROGLYCERIN 0.4 MG: 0.4 TABLET SUBLINGUAL at 08:54

## 2023-08-08 RX ADMIN — IOPAMIDOL 100 ML: 755 INJECTION, SOLUTION INTRAVENOUS at 09:10

## 2023-08-08 NOTE — PROGRESS NOTES
Patient arrives to IR Suite, per self, to prepare for a CTA. Patient is alert/oriented/appropriate and is able to follow directions and answer all questions. Procedure explained/questions answered to her satisfaction, monitoring equipment hooked up, PIV placed and EPOC performed.

## 2023-08-08 NOTE — PROGRESS NOTES
Patient tolerated CTA well, asymptomatic, no distress and VSS. PIV removed. All questioned answered and patient escorted to the Radiology waiting room.

## 2023-08-14 ENCOUNTER — TELEPHONE (OUTPATIENT)
Dept: CARDIOLOGY | Age: 68
End: 2023-08-14

## 2023-08-14 NOTE — TELEPHONE ENCOUNTER
Apurva Pi, MD  [facility]  Mild CAD. Already on statin.

## 2023-10-31 RX ORDER — OMEPRAZOLE 20 MG/1
CAPSULE, DELAYED RELEASE ORAL
Qty: 90 CAPSULE | Refills: 0 | Status: SHIPPED | OUTPATIENT
Start: 2023-10-31

## 2023-11-04 ENCOUNTER — HOSPITAL ENCOUNTER (OUTPATIENT)
Age: 68
Discharge: HOME | End: 2023-11-04
Payer: MEDICARE

## 2023-11-04 DIAGNOSIS — E07.9 DISORDER OF THYROID, UNSPECIFIED: ICD-10-CM

## 2023-11-04 DIAGNOSIS — K21.9 GASTROESOPHAGEAL REFLUX DISEASE WITHOUT ESOPHAGITIS: ICD-10-CM

## 2023-11-04 DIAGNOSIS — E78.00 PURE HYPERCHOLESTEROLEMIA: ICD-10-CM

## 2023-11-04 LAB
ALBUMIN SERPL-MCNC: 4.3 G/DL (ref 3.5–5.2)
ALBUMIN/GLOB SERPL: 1.9 {RATIO} (ref 1–2.5)
ALP SERPL-CCNC: 99 U/L (ref 35–104)
ALT SERPL-CCNC: 11 U/L (ref 5–33)
ANION GAP SERPL CALCULATED.3IONS-SCNC: 6 MMOL/L (ref 9–17)
AST SERPL-CCNC: 16 U/L
BASOPHILS # BLD: 0.03 K/UL (ref 0–0.2)
BASOPHILS NFR BLD: 1 % (ref 0–2)
BILIRUB SERPL-MCNC: 0.4 MG/DL (ref 0.3–1.2)
BUN SERPL-MCNC: 12 MG/DL (ref 8–23)
BUN/CREAT SERPL: 17 (ref 9–20)
CALCIUM SERPL-MCNC: 9.8 MG/DL (ref 8.6–10.4)
CHLORIDE SERPL-SCNC: 109 MMOL/L (ref 98–107)
CHOLEST SERPL-MCNC: 176 MG/DL
CHOLESTEROL/HDL RATIO: 3.7
CO2 SERPL-SCNC: 31 MMOL/L (ref 20–31)
CREAT SERPL-MCNC: 0.7 MG/DL (ref 0.5–0.9)
EOSINOPHIL # BLD: 0.46 K/UL (ref 0–0.44)
EOSINOPHILS RELATIVE PERCENT: 10 % (ref 1–4)
ERYTHROCYTE [DISTWIDTH] IN BLOOD BY AUTOMATED COUNT: 12.7 % (ref 11.8–14.4)
GFR SERPL CREATININE-BSD FRML MDRD: >60 ML/MIN/1.73M2
GLUCOSE SERPL-MCNC: 90 MG/DL (ref 70–99)
HCT VFR BLD AUTO: 43 % (ref 36.3–47.1)
HDLC SERPL-MCNC: 47 MG/DL
HGB BLD-MCNC: 13.6 G/DL (ref 11.9–15.1)
IMM GRANULOCYTES # BLD AUTO: <0.03 K/UL (ref 0–0.3)
IMM GRANULOCYTES NFR BLD: 0 %
LDLC SERPL CALC-MCNC: 100 MG/DL (ref 0–130)
LYMPHOCYTES NFR BLD: 1.74 K/UL (ref 1.1–3.7)
LYMPHOCYTES RELATIVE PERCENT: 38 % (ref 24–43)
MCH RBC QN AUTO: 29.2 PG (ref 25.2–33.5)
MCHC RBC AUTO-ENTMCNC: 31.6 G/DL (ref 25.2–33.5)
MCV RBC AUTO: 92.3 FL (ref 82.6–102.9)
MONOCYTES NFR BLD: 0.58 K/UL (ref 0.1–1.2)
MONOCYTES NFR BLD: 13 % (ref 3–12)
NEUTROPHILS NFR BLD: 38 % (ref 36–65)
NEUTS SEG NFR BLD: 1.81 K/UL (ref 1.5–8.1)
NRBC BLD-RTO: 0 PER 100 WBC
PLATELET # BLD AUTO: 210 K/UL (ref 138–453)
PMV BLD AUTO: 9.1 FL (ref 8.1–13.5)
POTASSIUM SERPL-SCNC: 4 MMOL/L (ref 3.7–5.3)
PROT SERPL-MCNC: 6.6 G/DL (ref 6.4–8.3)
RBC # BLD AUTO: 4.66 M/UL (ref 3.95–5.11)
SODIUM SERPL-SCNC: 146 MMOL/L (ref 135–144)
TRIGL SERPL-MCNC: 144 MG/DL
TSH SERPL DL<=0.05 MIU/L-ACNC: 3.52 UIU/ML (ref 0.3–5)
WBC OTHER # BLD: 4.6 K/UL (ref 3.5–11.3)

## 2023-11-04 PROCEDURE — 36415 COLL VENOUS BLD VENIPUNCTURE: CPT

## 2023-11-04 PROCEDURE — 80061 LIPID PANEL: CPT

## 2023-11-04 PROCEDURE — 85025 COMPLETE CBC W/AUTO DIFF WBC: CPT

## 2023-11-04 PROCEDURE — 84443 ASSAY THYROID STIM HORMONE: CPT

## 2023-11-04 PROCEDURE — 80053 COMPREHEN METABOLIC PANEL: CPT

## 2023-11-06 RX ORDER — SIMVASTATIN 40 MG
TABLET ORAL
Qty: 90 TABLET | Refills: 1 | Status: SHIPPED | OUTPATIENT
Start: 2023-11-06

## 2023-11-06 NOTE — TELEPHONE ENCOUNTER
Devon Ashley called requesting a refill of the below medication which has been pended for you:     Requested Prescriptions     Pending Prescriptions Disp Refills    simvastatin (ZOCOR) 40 MG tablet [Pharmacy Med Name: Simvastatin 40 MG Oral Tablet] 90 tablet 1     Sig: Take 1 tablet by mouth nightly       Last Appointment Date: 5/10/2023  Next Appointment Date: 11/10/2023    Allergies   Allergen Reactions    Lipitor [Atorvastatin] Other (See Comments)     Myalgias        Percocet [Oxycodone-Acetaminophen] Nausea Only    Vicodin [Hydrocodone-Acetaminophen] Nausea Only

## 2023-11-10 ENCOUNTER — TELEPHONE (OUTPATIENT)
Dept: FAMILY MEDICINE CLINIC | Age: 68
End: 2023-11-10

## 2023-11-10 ENCOUNTER — OFFICE VISIT (OUTPATIENT)
Dept: FAMILY MEDICINE CLINIC | Age: 68
End: 2023-11-10
Payer: MEDICARE

## 2023-11-10 VITALS
HEIGHT: 64 IN | DIASTOLIC BLOOD PRESSURE: 80 MMHG | BODY MASS INDEX: 23.83 KG/M2 | OXYGEN SATURATION: 97 % | SYSTOLIC BLOOD PRESSURE: 130 MMHG | WEIGHT: 139.6 LBS | HEART RATE: 54 BPM

## 2023-11-10 DIAGNOSIS — K21.9 GASTROESOPHAGEAL REFLUX DISEASE WITHOUT ESOPHAGITIS: Primary | ICD-10-CM

## 2023-11-10 DIAGNOSIS — Z12.31 VISIT FOR SCREENING MAMMOGRAM: Primary | ICD-10-CM

## 2023-11-10 DIAGNOSIS — M19.042 PRIMARY OSTEOARTHRITIS OF BOTH HANDS: ICD-10-CM

## 2023-11-10 DIAGNOSIS — M19.041 PRIMARY OSTEOARTHRITIS OF BOTH HANDS: ICD-10-CM

## 2023-11-10 DIAGNOSIS — I87.2 VENOUS INSUFFICIENCY: ICD-10-CM

## 2023-11-10 DIAGNOSIS — R79.89 TSH ELEVATION: ICD-10-CM

## 2023-11-10 DIAGNOSIS — Z79.890 HORMONE REPLACEMENT THERAPY: ICD-10-CM

## 2023-11-10 DIAGNOSIS — E78.49 OTHER HYPERLIPIDEMIA: ICD-10-CM

## 2023-11-10 DIAGNOSIS — E78.00 PURE HYPERCHOLESTEROLEMIA: ICD-10-CM

## 2023-11-10 DIAGNOSIS — R00.1 BRADYCARDIA: ICD-10-CM

## 2023-11-10 PROCEDURE — 1036F TOBACCO NON-USER: CPT | Performed by: FAMILY MEDICINE

## 2023-11-10 PROCEDURE — 99214 OFFICE O/P EST MOD 30 MIN: CPT | Performed by: FAMILY MEDICINE

## 2023-11-10 PROCEDURE — 99213 OFFICE O/P EST LOW 20 MIN: CPT | Performed by: FAMILY MEDICINE

## 2023-11-10 PROCEDURE — 1090F PRES/ABSN URINE INCON ASSESS: CPT | Performed by: FAMILY MEDICINE

## 2023-11-10 PROCEDURE — 3017F COLORECTAL CA SCREEN DOC REV: CPT | Performed by: FAMILY MEDICINE

## 2023-11-10 PROCEDURE — 1123F ACP DISCUSS/DSCN MKR DOCD: CPT | Performed by: FAMILY MEDICINE

## 2023-11-10 PROCEDURE — G8400 PT W/DXA NO RESULTS DOC: HCPCS | Performed by: FAMILY MEDICINE

## 2023-11-10 PROCEDURE — G8484 FLU IMMUNIZE NO ADMIN: HCPCS | Performed by: FAMILY MEDICINE

## 2023-11-10 PROCEDURE — G8427 DOCREV CUR MEDS BY ELIG CLIN: HCPCS | Performed by: FAMILY MEDICINE

## 2023-11-10 PROCEDURE — G8420 CALC BMI NORM PARAMETERS: HCPCS | Performed by: FAMILY MEDICINE

## 2023-11-10 ASSESSMENT — ENCOUNTER SYMPTOMS
EYES NEGATIVE: 1
ALLERGIC/IMMUNOLOGIC NEGATIVE: 1
RESPIRATORY NEGATIVE: 1
BACK PAIN: 0
GASTROINTESTINAL NEGATIVE: 1

## 2023-11-10 NOTE — PATIENT INSTRUCTIONS
Hospital Outpatient Visit on 11/04/2023   Component Date Value Ref Range Status    TSH 11/04/2023 3.52  0.30 - 5.00 uIU/mL Final    WBC 11/04/2023 4.6  3.5 - 11.3 k/uL Final    RBC 11/04/2023 4.66  3.95 - 5.11 m/uL Final    Hemoglobin 11/04/2023 13.6  11.9 - 15.1 g/dL Final    Hematocrit 11/04/2023 43.0  36.3 - 47.1 % Final    MCV 11/04/2023 92.3  82.6 - 102.9 fL Final    MCH 11/04/2023 29.2  25.2 - 33.5 pg Final    MCHC 11/04/2023 31.6  25.2 - 33.5 g/dL Final    RDW 11/04/2023 12.7  11.8 - 14.4 % Final    Platelets 66/12/6768 210  138 - 453 k/uL Final    MPV 11/04/2023 9.1  8.1 - 13.5 fL Final    NRBC Automated 11/04/2023 0.0  0.0 per 100 WBC Final    Neutrophils % 11/04/2023 38  36 - 65 % Final    Lymphocytes % 11/04/2023 38  24 - 43 % Final    Monocytes % 11/04/2023 13 (H)  3 - 12 % Final    Eosinophils % 11/04/2023 10 (H)  1 - 4 % Final    Basophils % 11/04/2023 1  0 - 2 % Final    Immature Granulocytes 11/04/2023 0  0 % Final    Neutrophils Absolute 11/04/2023 1.81  1.50 - 8.10 k/uL Final    Lymphocytes Absolute 11/04/2023 1.74  1.10 - 3.70 k/uL Final    Monocytes Absolute 11/04/2023 0.58  0.10 - 1.20 k/uL Final    Eosinophils Absolute 11/04/2023 0.46 (H)  0.00 - 0.44 k/uL Final    Basophils Absolute 11/04/2023 0.03  0.00 - 0.20 k/uL Final    Absolute Immature Granulocyte 11/04/2023 <0.03  0.00 - 0.30 k/uL Final    Sodium 11/04/2023 146 (H)  135 - 144 mmol/L Final    Potassium 11/04/2023 4.0  3.7 - 5.3 mmol/L Final    Chloride 11/04/2023 109 (H)  98 - 107 mmol/L Final    CO2 11/04/2023 31  20 - 31 mmol/L Final    Anion Gap 11/04/2023 6 (L)  9 - 17 mmol/L Final    Glucose 11/04/2023 90  70 - 99 mg/dL Final    BUN 11/04/2023 12  8 - 23 mg/dL Final    Creatinine 11/04/2023 0.7  0.5 - 0.9 mg/dL Final    Est, Glom Filt Rate 11/04/2023 >60  >60 mL/min/1.73m2 Final    Comment:       These results are not intended for use in patients <25years of age.         eGFR results are calculated without a race factor using the

## 2023-11-10 NOTE — PROGRESS NOTES
and t wave inversion in inferior/lateral leads. She had a negative stress test.  Reassurance. Cont. Risk factor modifications. Bradycardia continues. She denies symptoms of concern. Heart rate seems to rise with exertion like it is supposed to. Received flu and covid vaccines recently.

## 2023-12-12 ENCOUNTER — HOSPITAL ENCOUNTER (OUTPATIENT)
Dept: MAMMOGRAPHY | Age: 68
Discharge: HOME | End: 2023-12-14
Payer: MEDICARE

## 2023-12-12 VITALS — HEIGHT: 63 IN | WEIGHT: 140 LBS | BODY MASS INDEX: 24.8 KG/M2

## 2023-12-12 DIAGNOSIS — Z12.31 VISIT FOR SCREENING MAMMOGRAM: ICD-10-CM

## 2023-12-12 PROCEDURE — 77063 BREAST TOMOSYNTHESIS BI: CPT

## 2024-01-31 ENCOUNTER — OFFICE VISIT (OUTPATIENT)
Dept: CARDIOLOGY | Age: 69
End: 2024-01-31
Payer: MEDICARE

## 2024-01-31 ENCOUNTER — TELEPHONE (OUTPATIENT)
Dept: CARDIOLOGY | Age: 69
End: 2024-01-31

## 2024-01-31 VITALS
SYSTOLIC BLOOD PRESSURE: 112 MMHG | DIASTOLIC BLOOD PRESSURE: 64 MMHG | WEIGHT: 145 LBS | HEART RATE: 54 BPM | BODY MASS INDEX: 25.69 KG/M2

## 2024-01-31 DIAGNOSIS — I25.10 CORONARY ARTERY DISEASE INVOLVING NATIVE CORONARY ARTERY OF NATIVE HEART WITHOUT ANGINA PECTORIS: Primary | ICD-10-CM

## 2024-01-31 DIAGNOSIS — E78.5 HYPERLIPIDEMIA, UNSPECIFIED HYPERLIPIDEMIA TYPE: ICD-10-CM

## 2024-01-31 PROCEDURE — 99214 OFFICE O/P EST MOD 30 MIN: CPT | Performed by: INTERNAL MEDICINE

## 2024-01-31 PROCEDURE — 1123F ACP DISCUSS/DSCN MKR DOCD: CPT | Performed by: INTERNAL MEDICINE

## 2024-01-31 PROCEDURE — 1036F TOBACCO NON-USER: CPT | Performed by: INTERNAL MEDICINE

## 2024-01-31 PROCEDURE — 1090F PRES/ABSN URINE INCON ASSESS: CPT | Performed by: INTERNAL MEDICINE

## 2024-01-31 PROCEDURE — G8400 PT W/DXA NO RESULTS DOC: HCPCS | Performed by: INTERNAL MEDICINE

## 2024-01-31 PROCEDURE — 93010 ELECTROCARDIOGRAM REPORT: CPT | Performed by: INTERNAL MEDICINE

## 2024-01-31 PROCEDURE — 93005 ELECTROCARDIOGRAM TRACING: CPT | Performed by: INTERNAL MEDICINE

## 2024-01-31 PROCEDURE — 3017F COLORECTAL CA SCREEN DOC REV: CPT | Performed by: INTERNAL MEDICINE

## 2024-01-31 PROCEDURE — G8417 CALC BMI ABV UP PARAM F/U: HCPCS | Performed by: INTERNAL MEDICINE

## 2024-01-31 PROCEDURE — G8427 DOCREV CUR MEDS BY ELIG CLIN: HCPCS | Performed by: INTERNAL MEDICINE

## 2024-01-31 PROCEDURE — G8484 FLU IMMUNIZE NO ADMIN: HCPCS | Performed by: INTERNAL MEDICINE

## 2024-01-31 RX ORDER — EZETIMIBE 10 MG/1
10 TABLET ORAL DAILY
Qty: 30 TABLET | Refills: 5 | Status: SHIPPED | OUTPATIENT
Start: 2024-01-31

## 2024-01-31 RX ORDER — ASPIRIN 81 MG/1
81 TABLET ORAL DAILY
Qty: 30 TABLET | Refills: 5 | Status: SHIPPED | OUTPATIENT
Start: 2024-01-31

## 2024-01-31 NOTE — PROGRESS NOTES
qday.  -Hyperlipidemia- continue simvastatin. She had myalgias with atorvastatin. Add zetia 10mg po qday.  on 11/4/23  -Dizziness- could be due to low BP. Compression socks knee high and off while sleeping. Gatorade zero 20-30oz a day.   -GERD  -RTC 12 months.    Alex White MD  [facility]  [phone]

## 2024-01-31 NOTE — TELEPHONE ENCOUNTER
Patient called in and stated that her Zetia 10mg was 61 dollars at [facility] pharmacy and wondered if there was a generic version or a different alternative.

## 2024-02-02 PROBLEM — Z31.9 ENCOUNTER FOR PROCREATIVE MANAGEMENT: Status: ACTIVE | Noted: 2024-02-02

## 2024-02-02 PROBLEM — G89.18 POSTOPERATIVE PAIN: Status: ACTIVE | Noted: 2022-03-10

## 2024-02-02 PROBLEM — M65.331 ACQUIRED TRIGGER FINGER OF RIGHT MIDDLE FINGER: Status: ACTIVE | Noted: 2024-02-02

## 2024-02-02 PROBLEM — M65.342 ACQUIRED TRIGGER FINGER OF LEFT RING FINGER: Status: ACTIVE | Noted: 2022-04-21

## 2024-02-02 RX ORDER — OMEPRAZOLE 20 MG/1
CAPSULE, DELAYED RELEASE ORAL
Qty: 90 CAPSULE | Refills: 0 | Status: SHIPPED | OUTPATIENT
Start: 2024-02-02

## 2024-02-02 NOTE — TELEPHONE ENCOUNTER
Vanessa called requesting a refill of the below medication which has been pended for you:     Requested Prescriptions     Pending Prescriptions Disp Refills    omeprazole (PRILOSEC) 20 MG delayed release capsule [Pharmacy Med Name: Omeprazole 20 MG Oral Capsule Delayed Release] 90 capsule 0     Sig: Take 1 capsule by mouth once daily       Last Appointment Date: 11/10/2023  Next Appointment Date: 5/10/2024    Allergies   Allergen Reactions    Lipitor [Atorvastatin] Other (See Comments)     Myalgias        Oxycodone Nausea Only    Percocet [Oxycodone-Acetaminophen] Nausea Only    Propoxyphene Nausea Only    Vicodin [Hydrocodone-Acetaminophen] Nausea Only

## 2024-04-29 ENCOUNTER — TELEPHONE (OUTPATIENT)
Dept: SURGERY | Age: 69
End: 2024-04-29

## 2024-04-29 NOTE — TELEPHONE ENCOUNTER
Patient called the office asking to schedule a colonoscopy.  Patient received a 10 year recall letter.  Dr Nolan's colonoscopy paperwork sent.

## 2024-05-03 RX ORDER — OMEPRAZOLE 20 MG/1
CAPSULE, DELAYED RELEASE ORAL
Qty: 90 CAPSULE | Refills: 1 | Status: SHIPPED | OUTPATIENT
Start: 2024-05-03

## 2024-05-03 RX ORDER — SIMVASTATIN 40 MG
TABLET ORAL
Qty: 90 TABLET | Refills: 1 | Status: SHIPPED | OUTPATIENT
Start: 2024-05-03

## 2024-05-03 NOTE — TELEPHONE ENCOUNTER
Vanessa called requesting a refill of the below medication which has been pended for you:     Requested Prescriptions     Pending Prescriptions Disp Refills    simvastatin (ZOCOR) 40 MG tablet [Pharmacy Med Name: Simvastatin 40 MG Oral Tablet] 90 tablet 0     Sig: Take 1 tablet by mouth nightly    omeprazole (PRILOSEC) 20 MG delayed release capsule [Pharmacy Med Name: Omeprazole 20 MG Oral Capsule Delayed Release] 90 capsule 0     Sig: Take 1 capsule by mouth once daily       Last Appointment Date: 11/10/2023  Next Appointment Date: 5/10/2024    Allergies   Allergen Reactions    Lipitor [Atorvastatin] Other (See Comments)     Myalgias        Oxycodone Nausea Only    Percocet [Oxycodone-Acetaminophen] Nausea Only    Propoxyphene Nausea Only    Vicodin [Hydrocodone-Acetaminophen] Nausea Only

## 2024-05-04 ENCOUNTER — HOSPITAL ENCOUNTER (OUTPATIENT)
Age: 69
Discharge: HOME | End: 2024-05-04
Payer: MEDICARE

## 2024-05-04 DIAGNOSIS — E78.00 PURE HYPERCHOLESTEROLEMIA: ICD-10-CM

## 2024-05-04 DIAGNOSIS — E78.49 OTHER HYPERLIPIDEMIA: ICD-10-CM

## 2024-05-04 DIAGNOSIS — R79.89 TSH ELEVATION: ICD-10-CM

## 2024-05-04 DIAGNOSIS — K21.9 GASTROESOPHAGEAL REFLUX DISEASE WITHOUT ESOPHAGITIS: ICD-10-CM

## 2024-05-04 LAB
ALBUMIN SERPL-MCNC: 4 G/DL (ref 3.5–5.2)
ALBUMIN/GLOB SERPL: 1.5 {RATIO} (ref 1–2.5)
ALP SERPL-CCNC: 98 U/L (ref 35–104)
ALT SERPL-CCNC: 14 U/L (ref 5–33)
ANION GAP SERPL CALCULATED.3IONS-SCNC: 7 MMOL/L (ref 9–17)
AST SERPL-CCNC: 18 U/L
BASOPHILS # BLD: 0.05 K/UL (ref 0–0.2)
BASOPHILS NFR BLD: 1 % (ref 0–2)
BILIRUB SERPL-MCNC: 0.5 MG/DL (ref 0.3–1.2)
BUN SERPL-MCNC: 11 MG/DL (ref 8–23)
BUN/CREAT SERPL: 18 (ref 9–20)
CALCIUM SERPL-MCNC: 9.6 MG/DL (ref 8.6–10.4)
CHLORIDE SERPL-SCNC: 108 MMOL/L (ref 98–107)
CHOLEST SERPL-MCNC: 149 MG/DL (ref 0–199)
CHOLESTEROL/HDL RATIO: 3
CO2 SERPL-SCNC: 30 MMOL/L (ref 20–31)
CREAT SERPL-MCNC: 0.6 MG/DL (ref 0.5–0.9)
EOSINOPHIL # BLD: 0.42 K/UL (ref 0–0.44)
EOSINOPHILS RELATIVE PERCENT: 7 % (ref 1–4)
ERYTHROCYTE [DISTWIDTH] IN BLOOD BY AUTOMATED COUNT: 12.7 % (ref 11.8–14.4)
GFR, ESTIMATED: >90 ML/MIN/1.73M2
GLUCOSE SERPL-MCNC: 93 MG/DL (ref 70–99)
HCT VFR BLD AUTO: 42.5 % (ref 36.3–47.1)
HDLC SERPL-MCNC: 54 MG/DL
HGB BLD-MCNC: 14 G/DL (ref 11.9–15.1)
IMM GRANULOCYTES # BLD AUTO: <0.03 K/UL (ref 0–0.3)
IMM GRANULOCYTES NFR BLD: 0 %
LDLC SERPL CALC-MCNC: 76 MG/DL (ref 0–100)
LYMPHOCYTES NFR BLD: 1.95 K/UL (ref 1.1–3.7)
LYMPHOCYTES RELATIVE PERCENT: 33 % (ref 24–43)
MCH RBC QN AUTO: 30.2 PG (ref 25.2–33.5)
MCHC RBC AUTO-ENTMCNC: 32.9 G/DL (ref 25.2–33.5)
MCV RBC AUTO: 91.6 FL (ref 82.6–102.9)
MONOCYTES NFR BLD: 0.6 K/UL (ref 0.1–1.2)
MONOCYTES NFR BLD: 10 % (ref 3–12)
NEUTROPHILS NFR BLD: 49 % (ref 36–65)
NEUTS SEG NFR BLD: 2.82 K/UL (ref 1.5–8.1)
NRBC BLD-RTO: 0 PER 100 WBC
PLATELET # BLD AUTO: 243 K/UL (ref 138–453)
PMV BLD AUTO: 9.2 FL (ref 8.1–13.5)
POTASSIUM SERPL-SCNC: 4.1 MMOL/L (ref 3.7–5.3)
PROT SERPL-MCNC: 6.7 G/DL (ref 6.4–8.3)
RBC # BLD AUTO: 4.64 M/UL (ref 3.95–5.11)
SODIUM SERPL-SCNC: 145 MMOL/L (ref 135–144)
TRIGL SERPL-MCNC: 95 MG/DL
TSH SERPL DL<=0.05 MIU/L-ACNC: 3.61 UIU/ML (ref 0.3–5)
VLDLC SERPL CALC-MCNC: 19 MG/DL
WBC OTHER # BLD: 5.9 K/UL (ref 3.5–11.3)

## 2024-05-04 PROCEDURE — 84443 ASSAY THYROID STIM HORMONE: CPT

## 2024-05-04 PROCEDURE — 80061 LIPID PANEL: CPT

## 2024-05-04 PROCEDURE — 80053 COMPREHEN METABOLIC PANEL: CPT

## 2024-05-04 PROCEDURE — 36415 COLL VENOUS BLD VENIPUNCTURE: CPT

## 2024-05-04 PROCEDURE — 85025 COMPLETE CBC W/AUTO DIFF WBC: CPT

## 2024-05-16 ENCOUNTER — TELEPHONE (OUTPATIENT)
Dept: SURGERY | Age: 69
End: 2024-05-16

## 2024-05-16 NOTE — TELEPHONE ENCOUNTER
[facility]         Patient:Vanessa Turpin           :1955           Surgical/Procedure Planned: Colonoscopy    Date & Location: 24 @ [facility]       Outpatient   Planned Length of OR: 30 minutes    Sedation: general        Estimated Cardiac Risk for Non-Cardiac Surgery/Procedure     Low__x____ Moderate______ High______    Medication Instructions - Clarification needed by this date:       ASA 81 mg daily Hold _5__ Days      Resume medications:     Lovenox indicated: _____Yes _x____NO    Provider:Dr Nolan      Signature of Provider Giving Orders for Medication holds:    Alex White MD    _____________________________________________

## 2024-05-16 NOTE — TELEPHONE ENCOUNTER
Instructions reviewed over the phone and mailed to the patient. All questions answered and patient denies any further questions at this time. Patient scheduled for Colonoscopy on 7-23-24 at [facility] with Dr. Nolan. Instructed patient she can purchase the Miralax/Gatorade bowel prep over the counter at her pharmacy.     Sent medication hold to Cardiology   
Unknown (5/10/2023)    PRAPARE - Transportation     Lack of Transportation (Medical): Not on file     Lack of Transportation (Non-Medical): No   Physical Activity: Not on file   Stress: Not on file   Social Connections: Not on file   Intimate Partner Violence: Not on file   Housing Stability: Unknown (5/10/2023)    Housing Stability Vital Sign     Unable to Pay for Housing in the Last Year: Not on file     Number of Places Lived in the Last Year: Not on file     Unstable Housing in the Last Year: No     Past Surgical History:   Procedure Laterality Date    BREAST BIOPSY Right     CARPAL TUNNEL RELEASE Left 2018    Dr. Edward, right 12/2018    COLONOSCOPY  06/14    normal -10 yrs    CYSTOCELE REPAIR      HYSTERECTOMY (CERVIX STATUS UNKNOWN)      INGUINAL HERNIA REPAIR Right 01/24/06    ID EXC CYST/ABERRANT BREAST TISSUE OPEN 1/> LESION Right 10/3/2018    Excision Right Breast Cyst performed by Don Nolan MD at [facility] OR.  COLLAPSED CYST WITH MURAL ORGANIZING ACTIVE INFLAMMATION AND       ROTATOR CUFF REPAIR Right 2012    ROTATOR CUFF REPAIR Left 02/14    SERENITY AND BSO (CERVIX REMOVED)      TONSILLECTOMY      TUBAL LIGATION       Past Medical History:   Diagnosis Date    GERD (gastroesophageal reflux disease)     High cholesterol     Hormone replacement therapy     Postsurgical menopause 2003    Age 47     Current Outpatient Medications on File Prior to Visit   Medication Sig Dispense Refill    simvastatin (ZOCOR) 40 MG tablet Take 1 tablet by mouth nightly 90 tablet 1    omeprazole (PRILOSEC) 20 MG delayed release capsule Take 1 capsule by mouth once daily 90 capsule 1    aspirin 81 MG EC tablet Take 1 tablet by mouth daily 30 tablet 5    ezetimibe (ZETIA) 10 MG tablet Take 1 tablet by mouth daily 30 tablet 5    vitamin B-12 (CYANOCOBALAMIN) 1000 MCG tablet Take 1 tablet by mouth daily      Cholecalciferol (VITAMIN D3) 2000 units CAPS Take by mouth      Multiple Vitamins-Minerals (MULTIVITAMIN PO) Take 1 capsule by

## 2024-05-24 DIAGNOSIS — I25.10 CORONARY ARTERY DISEASE INVOLVING NATIVE CORONARY ARTERY OF NATIVE HEART WITHOUT ANGINA PECTORIS: ICD-10-CM

## 2024-05-24 RX ORDER — ASPIRIN 81 MG/1
81 TABLET ORAL DAILY
Qty: 30 TABLET | Refills: 5 | Status: SHIPPED | OUTPATIENT
Start: 2024-05-24

## 2024-05-24 RX ORDER — ASPIRIN 81 MG/1
81 TABLET ORAL DAILY
Qty: 30 TABLET | Refills: 5 | Status: SHIPPED | OUTPATIENT
Start: 2024-05-24 | End: 2024-05-24 | Stop reason: SDUPTHER

## 2024-05-24 NOTE — TELEPHONE ENCOUNTER
Vanessa called requesting a refill of the below medication which has been pended for you:     Requested Prescriptions     Pending Prescriptions Disp Refills    aspirin 81 MG EC tablet 30 tablet 5     Sig: Take 1 tablet by mouth daily       1/31/2024       Visit date not found     Allergies   Allergen Reactions    Lipitor [Atorvastatin] Other (See Comments)     Myalgias        Oxycodone Nausea Only    Percocet [Oxycodone-Acetaminophen] Nausea Only    Propoxyphene Nausea Only    Vicodin [Hydrocodone-Acetaminophen] Nausea Only

## 2024-05-29 ENCOUNTER — APPOINTMENT (OUTPATIENT)
Dept: GENERAL RADIOLOGY | Age: 69
End: 2024-05-29
Payer: MEDICARE

## 2024-05-29 ENCOUNTER — HOSPITAL ENCOUNTER (EMERGENCY)
Age: 69
Discharge: HOME | End: 2024-05-29
Attending: EMERGENCY MEDICINE
Payer: MEDICARE

## 2024-05-29 VITALS
DIASTOLIC BLOOD PRESSURE: 70 MMHG | HEIGHT: 64 IN | BODY MASS INDEX: 23.9 KG/M2 | WEIGHT: 140 LBS | HEART RATE: 48 BPM | RESPIRATION RATE: 17 BRPM | SYSTOLIC BLOOD PRESSURE: 135 MMHG | TEMPERATURE: 97.6 F | OXYGEN SATURATION: 96 %

## 2024-05-29 DIAGNOSIS — R07.9 CHEST PAIN, UNSPECIFIED TYPE: Primary | ICD-10-CM

## 2024-05-29 LAB
ANION GAP SERPL CALCULATED.3IONS-SCNC: 11 MMOL/L (ref 9–17)
BASOPHILS # BLD: 0.05 K/UL (ref 0–0.2)
BASOPHILS NFR BLD: 1 % (ref 0–2)
BUN SERPL-MCNC: 12 MG/DL (ref 8–23)
BUN/CREAT SERPL: 17 (ref 9–20)
CALCIUM SERPL-MCNC: 9.6 MG/DL (ref 8.6–10.4)
CHLORIDE SERPL-SCNC: 105 MMOL/L (ref 98–107)
CO2 SERPL-SCNC: 26 MMOL/L (ref 20–31)
CREAT SERPL-MCNC: 0.7 MG/DL (ref 0.5–0.9)
CRP SERPL HS-MCNC: <3 MG/L (ref 0–5)
D DIMER PPP FEU-MCNC: 0.27 UG/ML FEU (ref 0–0.59)
EOSINOPHIL # BLD: 0.52 K/UL (ref 0–0.44)
EOSINOPHILS RELATIVE PERCENT: 8 % (ref 1–4)
ERYTHROCYTE [DISTWIDTH] IN BLOOD BY AUTOMATED COUNT: 12.6 % (ref 11.8–14.4)
GFR, ESTIMATED: >90 ML/MIN/1.73M2
GLUCOSE SERPL-MCNC: 89 MG/DL (ref 70–99)
HCT VFR BLD AUTO: 41.5 % (ref 36.3–47.1)
HGB BLD-MCNC: 13.9 G/DL (ref 11.9–15.1)
IMM GRANULOCYTES # BLD AUTO: <0.03 K/UL (ref 0–0.3)
IMM GRANULOCYTES NFR BLD: 0 %
LYMPHOCYTES NFR BLD: 2.59 K/UL (ref 1.1–3.7)
LYMPHOCYTES RELATIVE PERCENT: 41 % (ref 24–43)
MAGNESIUM SERPL-MCNC: 2 MG/DL (ref 1.6–2.6)
MCH RBC QN AUTO: 30 PG (ref 25.2–33.5)
MCHC RBC AUTO-ENTMCNC: 33.5 G/DL (ref 25.2–33.5)
MCV RBC AUTO: 89.6 FL (ref 82.6–102.9)
MONOCYTES NFR BLD: 0.62 K/UL (ref 0.1–1.2)
MONOCYTES NFR BLD: 10 % (ref 3–12)
NEUTROPHILS NFR BLD: 40 % (ref 36–65)
NEUTS SEG NFR BLD: 2.53 K/UL (ref 1.5–8.1)
NRBC BLD-RTO: 0 PER 100 WBC
PLATELET # BLD AUTO: 249 K/UL (ref 138–453)
PMV BLD AUTO: 9.1 FL (ref 8.1–13.5)
POTASSIUM SERPL-SCNC: 3.8 MMOL/L (ref 3.7–5.3)
RBC # BLD AUTO: 4.63 M/UL (ref 3.95–5.11)
SODIUM SERPL-SCNC: 142 MMOL/L (ref 135–144)
TROPONIN I SERPL HS-MCNC: 6 NG/L (ref 0–14)
TROPONIN I SERPL HS-MCNC: 7 NG/L (ref 0–14)
WBC OTHER # BLD: 6.3 K/UL (ref 3.5–11.3)

## 2024-05-29 PROCEDURE — 93005 ELECTROCARDIOGRAM TRACING: CPT | Performed by: EMERGENCY MEDICINE

## 2024-05-29 PROCEDURE — 84484 ASSAY OF TROPONIN QUANT: CPT

## 2024-05-29 PROCEDURE — 80048 BASIC METABOLIC PNL TOTAL CA: CPT

## 2024-05-29 PROCEDURE — 71046 X-RAY EXAM CHEST 2 VIEWS: CPT

## 2024-05-29 PROCEDURE — 86140 C-REACTIVE PROTEIN: CPT

## 2024-05-29 PROCEDURE — 99285 EMERGENCY DEPT VISIT HI MDM: CPT

## 2024-05-29 PROCEDURE — 85025 COMPLETE CBC W/AUTO DIFF WBC: CPT

## 2024-05-29 PROCEDURE — 36415 COLL VENOUS BLD VENIPUNCTURE: CPT

## 2024-05-29 PROCEDURE — 83735 ASSAY OF MAGNESIUM: CPT

## 2024-05-29 PROCEDURE — 85379 FIBRIN DEGRADATION QUANT: CPT

## 2024-05-29 ASSESSMENT — PAIN - FUNCTIONAL ASSESSMENT: PAIN_FUNCTIONAL_ASSESSMENT: NONE - DENIES PAIN

## 2024-05-29 ASSESSMENT — LIFESTYLE VARIABLES
HOW OFTEN DO YOU HAVE A DRINK CONTAINING ALCOHOL: NEVER
HOW MANY STANDARD DRINKS CONTAINING ALCOHOL DO YOU HAVE ON A TYPICAL DAY: PATIENT DOES NOT DRINK

## 2024-05-30 NOTE — ED PROVIDER NOTES
[facility]  [address] [address]  Phone: [phone]        ADDENDUM:      Care of this patient was assumed from Dr. Mak.  The patient was seen for Chest Pain and Hypertension  .  The patient's initial evaluation and plan have been discussed with the prior provider who initially evaluated the patient.  Nursing Notes, Past Medical Hx, Past Surgical Hx, Allergies, were all reviewed.    PAST MEDICAL HISTORY    has a past medical history of GERD (gastroesophageal reflux disease), High cholesterol, Hormone replacement therapy, and Postsurgical menopause.    SURGICAL HISTORY      has a past surgical history that includes Inguinal hernia repair (Right, 01/24/06); Total abdominal hysterectomy w/ bilateral salpingoophorectomy; Tubal ligation; Tonsillectomy; Rotator cuff repair (Right, 2012); Cystocele repair; Colonoscopy (06/14); Rotator cuff repair (Left, 02/14); Carpal tunnel release (Left, 2018); pr exc cyst/aberrant breast tissue open 1/> lesion (Right, 10/3/2018); Breast biopsy (Right); and Hysterectomy.    CURRENT MEDICATIONS       Previous Medications    ASPIRIN 81 MG EC TABLET    Take 1 tablet by mouth daily    CHOLECALCIFEROL (VITAMIN D3) 2000 UNITS CAPS    Take by mouth    EZETIMIBE (ZETIA) 10 MG TABLET    Take 1 tablet by mouth daily    MULTIPLE VITAMINS-MINERALS (MULTIVITAMIN PO)    Take 1 capsule by mouth daily    OMEPRAZOLE (PRILOSEC) 20 MG DELAYED RELEASE CAPSULE    Take 1 capsule by mouth once daily    SIMVASTATIN (ZOCOR) 40 MG TABLET    Take 1 tablet by mouth nightly    VITAMIN B-12 (CYANOCOBALAMIN) 1000 MCG TABLET    Take 1 tablet by mouth daily       ALLERGIES     is allergic to lipitor [atorvastatin], oxycodone, percocet [oxycodone-acetaminophen], propoxyphene, and vicodin [hydrocodone-acetaminophen].      Diagnostic Results     EKG: All EKG's are interpreted by the Emergency Department Physician who either signs or Co-signs this chart in the absenceof a 
(Bazett) 440 ms    P Axis 43 degrees    R Axis -22 degrees    T Axis -19 degrees       Not indicated unless otherwise documented above    RADIOLOGY:   I reviewed the radiologist interpretations:    XR CHEST (2 VW)   Final Result   No acute cardiopulmonary process.             Not indicated unless otherwise documented above    EMERGENCY DEPARTMENT COURSE:     The patient was given the following medications:  No orders of the defined types were placed in this encounter.       Vitals:   -------------------------  /70   Pulse 57   Temp 97.6 °F (36.4 °C) (Tympanic)   Resp 17   Ht 1.626 m (5' 4\")   Wt 63.5 kg (140 lb)   LMP  (LMP Unknown)   SpO2 95%   BMI 24.03 kg/m²     Patient has sinus bradycardia 54 bpm.  She is not currently having chest pain.  Lab studies will be obtained and the patient will be signed out at the end of my shift to Dr. Massey awaiting labs, chest x-ray, reevaluation, disposition.    Lab studies are unremarkable first troponin is negative.  Patient's EKG is unchanged from previous EKG in January.  The patient was discussed with Dr. Castro for cardiology who advised to order a CRP and a D-dimer.  Perform a CTA if the D-dimer is positive.  If the 2 troponins are negative and the CRP is negative he advised the patient can be discharged home and follow-up with cardiology as an outpatient to discuss an outpatient catheterization.  However since the patient's heart score is 4 if any of the test are positive and or the patient does not feel comfortable going home and she wants further evaluation as an inpatient she would need to be transferred to [facility] or another facility where she can have a heart catheterization done.    CRITICAL CARE:    None    PROCEDURES:    None      OARRS Report if indicated      FINAL IMPRESSION      1. Chest pain, unspecified type          DISPOSITION/PLAN   DISPOSITION          CONDITION ON DISPOSITION: STABLE       PATIENT REFERRED TO:  No follow-up provider

## 2024-05-31 LAB
EKG ATRIAL RATE: 54 BPM
EKG P AXIS: 43 DEGREES
EKG P-R INTERVAL: 162 MS
EKG Q-T INTERVAL: 464 MS
EKG QRS DURATION: 84 MS
EKG QTC CALCULATION (BAZETT): 440 MS
EKG R AXIS: -22 DEGREES
EKG T AXIS: -19 DEGREES
EKG VENTRICULAR RATE: 54 BPM

## 2024-06-04 ENCOUNTER — TELEPHONE (OUTPATIENT)
Dept: SURGERY | Age: 69
End: 2024-06-04

## 2024-06-04 NOTE — TELEPHONE ENCOUNTER
Patient call back and reschedule colonoscopy from 7-23-24 to 8-6-24. [facility] made aware

## 2024-06-04 NOTE — TELEPHONE ENCOUNTER
Left message to call office to reschedule colonoscopy due to Dr Nolan out of the office on 7-23-24

## 2024-07-22 DIAGNOSIS — E78.5 HYPERLIPIDEMIA, UNSPECIFIED HYPERLIPIDEMIA TYPE: ICD-10-CM

## 2024-07-22 RX ORDER — EZETIMIBE 10 MG/1
10 TABLET ORAL DAILY
Qty: 30 TABLET | Refills: 5 | Status: SHIPPED | OUTPATIENT
Start: 2024-07-22

## 2024-08-01 NOTE — H&P
H &P Colonoscopy  Patient:Vanessa Turpin                 :1955  (no) patient has seen Dr. Nolan prior to procedure  PCP: Dr Oseguera     CC:Screening for colon cancer           HPI:     ROS:  All 12 systems negative except the positives in the HPI.      Colonoscopy  Abd pain: no  Anemia: no  Bloating:no  Diarrhea: no  Constipation: no  Melena: no  Hematochezia:no  Rectal Bleeding:no  Rectal/Anal Pain:no  Pruritus: no  Family history colon Cancer: no  Previous colon cancer: no  Previous Colon Polyp: no  Change in bowels: no  Decrease caliber of stool: no  Change in color of stool: no     Previous work up date: 14 by Dr Leon with normal findings.         Family History         Family History   Problem Relation Age of Onset    Heart Disease Mother      High Blood Pressure Mother      High Cholesterol Mother      Thyroid Disease Mother      Other Mother           ascending aortic disection    Other Sister           Graves Disease    Cancer Sister           thyroid cancer    Thyroid Disease Sister           Social History               Socioeconomic History    Marital status:        Spouse name: Not on file    Number of children: Not on file    Years of education: Not on file    Highest education level: Not on file   Occupational History    Not on file   Tobacco Use    Smoking status: Never    Smokeless tobacco: Never   Substance and Sexual Activity    Alcohol use: Yes       Alcohol/week: 0.0 standard drinks of alcohol       Comment: occasionally    Drug use: No    Sexual activity: Yes       Partners: Male   Other Topics Concern    Not on file   Social History Narrative    Not on file      Social Determinants of Health           Financial Resource Strain: Low Risk  (5/10/2023)     Overall Financial Resource Strain (CARDIA)      Difficulty of Paying Living Expenses: Not hard at all   Food Insecurity: Unknown (5/10/2023)     Hunger Vital Sign      Worried About Running Out of Food in the Last Year: Never

## 2024-08-06 ENCOUNTER — ANESTHESIA (OUTPATIENT)
Dept: OPERATING ROOM | Age: 69
End: 2024-08-06
Payer: MEDICARE

## 2024-08-06 ENCOUNTER — ANESTHESIA EVENT (OUTPATIENT)
Dept: OPERATING ROOM | Age: 69
End: 2024-08-06
Payer: MEDICARE

## 2024-08-06 ENCOUNTER — HOSPITAL ENCOUNTER (OUTPATIENT)
Age: 69
Setting detail: OUTPATIENT SURGERY
Discharge: HOME | End: 2024-08-06
Attending: SURGERY | Admitting: SURGERY
Payer: MEDICARE

## 2024-08-06 VITALS
TEMPERATURE: 98.4 F | DIASTOLIC BLOOD PRESSURE: 57 MMHG | SYSTOLIC BLOOD PRESSURE: 121 MMHG | RESPIRATION RATE: 16 BRPM | HEIGHT: 64 IN | WEIGHT: 138.2 LBS | BODY MASS INDEX: 23.6 KG/M2 | OXYGEN SATURATION: 97 % | HEART RATE: 54 BPM

## 2024-08-06 PROCEDURE — 7100000010 HC PHASE II RECOVERY - FIRST 15 MIN: Performed by: SURGERY

## 2024-08-06 PROCEDURE — 2500000003 HC RX 250 WO HCPCS: Performed by: NURSE ANESTHETIST, CERTIFIED REGISTERED

## 2024-08-06 PROCEDURE — 3609027000 HC COLONOSCOPY: Performed by: SURGERY

## 2024-08-06 PROCEDURE — G0121 COLON CA SCRN NOT HI RSK IND: HCPCS | Performed by: SURGERY

## 2024-08-06 PROCEDURE — 7100000011 HC PHASE II RECOVERY - ADDTL 15 MIN: Performed by: SURGERY

## 2024-08-06 PROCEDURE — 2580000003 HC RX 258: Performed by: SURGERY

## 2024-08-06 PROCEDURE — 3700000000 HC ANESTHESIA ATTENDED CARE: Performed by: SURGERY

## 2024-08-06 PROCEDURE — 3700000001 HC ADD 15 MINUTES (ANESTHESIA): Performed by: SURGERY

## 2024-08-06 PROCEDURE — 6360000002 HC RX W HCPCS: Performed by: NURSE ANESTHETIST, CERTIFIED REGISTERED

## 2024-08-06 PROCEDURE — 2709999900 HC NON-CHARGEABLE SUPPLY: Performed by: SURGERY

## 2024-08-06 RX ORDER — LIDOCAINE HYDROCHLORIDE 40 MG/ML
INJECTION, SOLUTION RETROBULBAR PRN
Status: DISCONTINUED | OUTPATIENT
Start: 2024-08-06 | End: 2024-08-06 | Stop reason: SDUPTHER

## 2024-08-06 RX ORDER — SODIUM CHLORIDE, SODIUM LACTATE, POTASSIUM CHLORIDE, CALCIUM CHLORIDE 600; 310; 30; 20 MG/100ML; MG/100ML; MG/100ML; MG/100ML
INJECTION, SOLUTION INTRAVENOUS CONTINUOUS
Status: DISCONTINUED | OUTPATIENT
Start: 2024-08-06 | End: 2024-08-06 | Stop reason: HOSPADM

## 2024-08-06 RX ORDER — PROPOFOL 10 MG/ML
INJECTION, EMULSION INTRAVENOUS PRN
Status: DISCONTINUED | OUTPATIENT
Start: 2024-08-06 | End: 2024-08-06 | Stop reason: SDUPTHER

## 2024-08-06 RX ADMIN — SODIUM CHLORIDE, POTASSIUM CHLORIDE, SODIUM LACTATE AND CALCIUM CHLORIDE: 600; 310; 30; 20 INJECTION, SOLUTION INTRAVENOUS at 07:23

## 2024-08-06 RX ADMIN — PROPOFOL 100 MG: 10 INJECTION, EMULSION INTRAVENOUS at 08:09

## 2024-08-06 RX ADMIN — PROPOFOL 180 MCG/KG/MIN: 10 INJECTION, EMULSION INTRAVENOUS at 08:10

## 2024-08-06 RX ADMIN — LIDOCAINE HYDROCHLORIDE 40 MG: 40 INJECTION, SOLUTION RETROBULBAR; TOPICAL at 08:09

## 2024-08-06 ASSESSMENT — PAIN - FUNCTIONAL ASSESSMENT: PAIN_FUNCTIONAL_ASSESSMENT: NONE - DENIES PAIN

## 2024-08-06 ASSESSMENT — PAIN SCALES - GENERAL
PAINLEVEL_OUTOF10: 0
PAINLEVEL_OUTOF10: 0

## 2024-08-06 NOTE — ANESTHESIA PRE PROCEDURE
Department of Anesthesiology  Preprocedure Note       Name:  Vanessa Turpin   Age:  69 y.o.  :  1955                                          MRN:  [id number]         Date:  2024      Surgeon: Surgeon(s):  Don Nolan MD    Procedure: Procedure(s):  Excision Right Breast Cyst    Medications prior to admission:   Prior to Admission medications    Medication Sig Start Date End Date Taking? Authorizing Provider   ezetimibe (ZETIA) 10 MG tablet Take 1 tablet by mouth once daily 24   Arti Dobson APRN - CNP   aspirin 81 MG EC tablet Take 1 tablet by mouth daily 24   Arti Dobson APRN - CNP   simvastatin (ZOCOR) 40 MG tablet Take 1 tablet by mouth nightly 5/3/24   Juanjose Oseguera MD   omeprazole (PRILOSEC) 20 MG delayed release capsule Take 1 capsule by mouth once daily 5/3/24   Juanjose Oseguera MD   vitamin B-12 (CYANOCOBALAMIN) 1000 MCG tablet Take 1 tablet by mouth daily    ProviderElena MD   Cholecalciferol (VITAMIN D3) 2000 units CAPS Take by mouth    ProviderElena MD   Multiple Vitamins-Minerals (MULTIVITAMIN PO) Take 1 capsule by mouth daily    ProviderElena MD       Current medications:    Current Facility-Administered Medications   Medication Dose Route Frequency Provider Last Rate Last Admin    lactated ringers IV soln infusion   IntraVENous Continuous Don Nolan MD           Allergies:    Allergies   Allergen Reactions    Lipitor [Atorvastatin] Other (See Comments)     Myalgias        Oxycodone Nausea Only    Percocet [Oxycodone-Acetaminophen] Nausea Only    Propoxyphene Nausea Only    Vicodin [Hydrocodone-Acetaminophen] Nausea Only       Problem List:    Patient Active Problem List   Diagnosis Code    GERD (gastroesophageal reflux disease) K21.9    Encounter for procreative management Z31.9    Acquired trigger finger of right middle finger M65.331    Acquired trigger finger of left ring finger M65.342    Postoperative pain G89.18       Past

## 2024-08-06 NOTE — OP NOTE
COLONOSCOPY PROCEDURE NOTE:      Pre op diagnosis:       1. Age 69  2. No prior h/o polyps  3. No FH of colon cancer  4. Last CS 6- - Dr. Leon - normal          Operative Procedure:    1.  Colonoscopy     Surgeon:    Dr. Don Nolan     Anesthesia:    IV sedation per CRNA    EBL:  minimal    Procedure:    Patient was taken to the endoscopy suite and placed in a left lateral decubitus position.  They were given IV sedation as mentioned above.  A digital rectal exam was performed.  There were no masses and anal tone was normal.  The colonoscope was inserted into the rectum and carefully manipulated up through the sigmoid colon, transverse colon, right colon and into the cecum.  The cecum was identified by the ileal cecal valve and transabdominal illumination.  Then the scope was slowly withdrawn.  The scope was retroflexed in the rectum and the dentate line was examined.  The scope was removed.  The patient tolerated the procedure well.  The following findings were noted.        Final Diagnosis:    Mild sigmoid diverticulosis    Plan:    Repeat CS in 10 years for screening, but should look at risks/rewards at age 79

## 2024-08-06 NOTE — DISCHARGE INSTRUCTIONS
Repeat Colonoscopy in 10 years for screening, but look at risks/rewards at age 79 Years old    DISCHARGE INSTRUCTIONS FOLLOWING COLONOSCOPY    Activity  You have received sedation.  Your judgement and coordination may be impaired.  Do not drive or operate any machinery today.  Do not make personal, medical, legal or business decisions today.  Do not consume alcohol, tranquilizers or sleeping medications today unless otherwise instructed by your physician.  Rest today.  You may resume normal activity tomorrow.    Because air is put into your colon during this procedure, it is normal to pass large amounts of air from your rectum.  Feelings of bloating, gas or cramping may persist for 24 hours.  You may not have a bowel movement for 1-3 days after this procedure.    Diet  Begin with sips of clear liquids and if no nausea, you may progress to your normal diet.    Medications  You may resume your usual medications, unless otherwise instructed.    Follow-Up  As instructed.    CALL YOUR PHYSICIAN if you experience any of the following:  Bleeding from your rectum (a teaspoonful or more)      - If you had a biopsy taken today, a small amount of bleeding may occur.  Severe abdominal pain/cramping and/or distention that is not relieved by passing gas.  Persistent nausea or vomiting.  Signs of infection including fever, chills, redness or swelling @ the IV site.      If you have questions or problems call [phone] ([facility]) or after business hours call [phone] ([facility])

## 2024-08-06 NOTE — ANESTHESIA POSTPROCEDURE EVALUATION
Department of Anesthesiology  Postprocedure Note    Patient: Vanessa Turpin  MRN: [id number]  YOB: 1955  Date of evaluation: 8/6/2024    Procedure Summary       Date: 08/06/24 Room / Location: [facility]    Anesthesia Start: 0805 Anesthesia Stop: 0829    Procedure: COLONOSCOPY Diagnosis:       Colon cancer screening      (Colon cancer screening [Z12.11])    Surgeons: Don Nolan MD Responsible Provider: Ko Contreras II, APRN - CRNA    Anesthesia Type: General, TIVA ASA Status: 2            Anesthesia Type: General, TIVA    Benito Phase I: Benito Score: 10    Benito Phase II: Benito Score: 9    Anesthesia Post Evaluation    Patient location during evaluation: bedside  Patient participation: complete - patient participated  Level of consciousness: awake  Pain score: 0  Airway patency: patent  Nausea & Vomiting: no nausea and no vomiting  Cardiovascular status: hemodynamically stable  Respiratory status: spontaneous ventilation  Hydration status: stable  Pain management: satisfactory to patient    No notable events documented.

## 2024-08-19 ENCOUNTER — TELEPHONE (OUTPATIENT)
Dept: SURGERY | Age: 69
End: 2024-08-19

## 2024-08-19 NOTE — TELEPHONE ENCOUNTER
Letter created and mailed to patient with results from recent CS at [facility] with Dr. Nolan on 8/6/2024. Updated history, health maintenance, and recall. Forwarded results to PCP.

## 2024-09-03 RX ORDER — SIMVASTATIN 40 MG
TABLET ORAL
Qty: 90 TABLET | Refills: 0 | Status: SHIPPED | OUTPATIENT
Start: 2024-09-03

## 2024-09-03 NOTE — TELEPHONE ENCOUNTER
Vanessa called requesting a refill of the below medication which has been pended for you:     Requested Prescriptions     Pending Prescriptions Disp Refills    simvastatin (ZOCOR) 40 MG tablet [Pharmacy Med Name: Simvastatin 40 MG Oral Tablet] 90 tablet 0     Sig: Take 1 tablet by mouth nightly       Last Appointment Date: 11/10/2023  Next Appointment Date: Visit date not found    Allergies   Allergen Reactions    Lipitor [Atorvastatin] Other (See Comments)     Myalgias        Oxycodone Nausea Only    Percocet [Oxycodone-Acetaminophen] Nausea Only    Propoxyphene Nausea Only    Vicodin [Hydrocodone-Acetaminophen] Nausea Only

## 2024-09-20 DIAGNOSIS — I25.10 CORONARY ARTERY DISEASE INVOLVING NATIVE CORONARY ARTERY OF NATIVE HEART WITHOUT ANGINA PECTORIS: ICD-10-CM

## 2024-09-20 RX ORDER — HYDROGEN PEROXIDE 2.65 ML/100ML
81 LIQUID ORAL; TOPICAL DAILY
Qty: 90 TABLET | Refills: 3 | Status: SHIPPED | OUTPATIENT
Start: 2024-09-20

## 2024-10-28 NOTE — TELEPHONE ENCOUNTER
Vanessa called requesting a refill of the below medication which has been pended for you:     Requested Prescriptions     Pending Prescriptions Disp Refills    omeprazole (PRILOSEC) 20 MG delayed release capsule [Pharmacy Med Name: OMEPRAZOLE 20MG CAP] 90 capsule 0     Sig: Take 1 capsule by mouth once daily       Last Appointment Date: 11/10/2023  Next Appointment Date: Visit date not found    Allergies   Allergen Reactions    Lipitor [Atorvastatin] Other (See Comments)     Myalgias        Oxycodone Nausea Only    Percocet [Oxycodone-Acetaminophen] Nausea Only    Propoxyphene Nausea Only    Vicodin [Hydrocodone-Acetaminophen] Nausea Only

## 2024-11-15 DIAGNOSIS — I25.10 CORONARY ARTERY DISEASE INVOLVING NATIVE CORONARY ARTERY OF NATIVE HEART WITHOUT ANGINA PECTORIS: ICD-10-CM

## 2024-11-15 RX ORDER — ASPIRIN 81 MG/1
81 TABLET ORAL DAILY
Qty: 90 TABLET | Refills: 3 | Status: SHIPPED | OUTPATIENT
Start: 2024-11-15

## 2024-11-20 DIAGNOSIS — E78.5 HYPERLIPIDEMIA, UNSPECIFIED HYPERLIPIDEMIA TYPE: ICD-10-CM

## 2024-11-20 RX ORDER — EZETIMIBE 10 MG/1
10 TABLET ORAL DAILY
Qty: 90 TABLET | Refills: 3 | Status: SHIPPED | OUTPATIENT
Start: 2024-11-20

## 2024-11-20 NOTE — TELEPHONE ENCOUNTER
Zetia 10 mg daily refills requested via fax from [facility] [place].    Last Appt:  1/31/2024  Next Appt:   2/5/2025  Med verified in Epic

## 2025-01-22 RX ORDER — SIMVASTATIN 40 MG
TABLET ORAL
Qty: 90 TABLET | Refills: 0 | Status: SHIPPED | OUTPATIENT
Start: 2025-01-22

## 2025-02-05 ENCOUNTER — OFFICE VISIT (OUTPATIENT)
Dept: CARDIOLOGY | Age: 70
End: 2025-02-05
Payer: MEDICARE

## 2025-02-05 VITALS
OXYGEN SATURATION: 98 % | WEIGHT: 143 LBS | SYSTOLIC BLOOD PRESSURE: 126 MMHG | HEIGHT: 64 IN | RESPIRATION RATE: 12 BRPM | BODY MASS INDEX: 24.41 KG/M2 | HEART RATE: 67 BPM | DIASTOLIC BLOOD PRESSURE: 80 MMHG

## 2025-02-05 DIAGNOSIS — I25.10 CORONARY ARTERY DISEASE INVOLVING NATIVE CORONARY ARTERY OF NATIVE HEART WITHOUT ANGINA PECTORIS: Primary | ICD-10-CM

## 2025-02-05 DIAGNOSIS — E78.5 HYPERLIPIDEMIA, UNSPECIFIED HYPERLIPIDEMIA TYPE: ICD-10-CM

## 2025-02-05 PROCEDURE — 93005 ELECTROCARDIOGRAM TRACING: CPT | Performed by: INTERNAL MEDICINE

## 2025-02-05 PROCEDURE — G8400 PT W/DXA NO RESULTS DOC: HCPCS | Performed by: INTERNAL MEDICINE

## 2025-02-05 PROCEDURE — 93010 ELECTROCARDIOGRAM REPORT: CPT | Performed by: INTERNAL MEDICINE

## 2025-02-05 PROCEDURE — 99214 OFFICE O/P EST MOD 30 MIN: CPT | Performed by: INTERNAL MEDICINE

## 2025-02-05 PROCEDURE — 1123F ACP DISCUSS/DSCN MKR DOCD: CPT | Performed by: INTERNAL MEDICINE

## 2025-02-05 PROCEDURE — 1159F MED LIST DOCD IN RCRD: CPT | Performed by: INTERNAL MEDICINE

## 2025-02-05 PROCEDURE — 3017F COLORECTAL CA SCREEN DOC REV: CPT | Performed by: INTERNAL MEDICINE

## 2025-02-05 PROCEDURE — 1090F PRES/ABSN URINE INCON ASSESS: CPT | Performed by: INTERNAL MEDICINE

## 2025-02-05 PROCEDURE — 1036F TOBACCO NON-USER: CPT | Performed by: INTERNAL MEDICINE

## 2025-02-05 PROCEDURE — 99212 OFFICE O/P EST SF 10 MIN: CPT | Performed by: INTERNAL MEDICINE

## 2025-02-05 PROCEDURE — G8420 CALC BMI NORM PARAMETERS: HCPCS | Performed by: INTERNAL MEDICINE

## 2025-02-05 PROCEDURE — G8427 DOCREV CUR MEDS BY ELIG CLIN: HCPCS | Performed by: INTERNAL MEDICINE

## 2025-02-05 NOTE — PROGRESS NOTES
Today's Date: 2/5/2025  Patient's Name: Vanessa Turpin  Patient's age: 69 y.o., 1955    Subjective:  Vanessa Turpin is being seen in clinic today regarding   Chief Complaint   Patient presents with    Coronary Artery Disease     Annual CAD.                       Cardiac Clearance     Pt is having wrist surgery 2/7/25     she is here for annual follow-up.  She is doing quite well from cardiac standpoint.  She remains completely asymptomatic.No chest pain, no dyspnea, no PND, no syncope or pre-syncope, no orthopnea.    Past Medical History:   has a past medical history of GERD (gastroesophageal reflux disease), High cholesterol, Hormone replacement therapy, and Postsurgical menopause.    Past Surgical History:   has a past surgical history that includes Inguinal hernia repair (Right, 01/24/2006); Total abdominal hysterectomy w/ bilateral salpingoophorectomy; Tubal ligation; Tonsillectomy; Rotator cuff repair (Right, 2012); Cystocele repair; Colonoscopy (06/2014); Rotator cuff repair (Left, 02/2014); Carpal tunnel release (Left, 2018); pr exc cyst/aberrant breast tissue open 1/> lesion (Right, 10/03/2018); Breast biopsy (Right); Hysterectomy; and Colonoscopy (N/A, 08/06/2024).    Home Medications:  Prior to Admission medications    Medication Sig Start Date End Date Taking? Authorizing Provider   omeprazole (PRILOSEC) 20 MG delayed release capsule Take 1 capsule by mouth once daily 1/28/25  Yes Juanjose Oseguera MD   simvastatin (ZOCOR) 40 MG tablet Take 1 tablet by mouth nightly 1/22/25  Yes Juanjose Oseguera MD   ezetimibe (ZETIA) 10 MG tablet Take 1 tablet by mouth daily 11/20/24  Yes Kera Street APRN - CNP   aspirin (EQ ASPIRIN ADULT LOW DOSE) 81 MG EC tablet Take 1 tablet by mouth daily 11/15/24  Yes Kera Street APRN - CNP   vitamin B-12 (CYANOCOBALAMIN) 1000 MCG tablet Take 1 tablet by mouth daily   Yes Provider, MD Elena   Cholecalciferol (VITAMIN D3) 2000 units CAPS Take by mouth   Yes

## 2025-04-20 DIAGNOSIS — E78.00 PURE HYPERCHOLESTEROLEMIA: Primary | ICD-10-CM

## 2025-04-21 RX ORDER — SIMVASTATIN 40 MG
40 TABLET ORAL NIGHTLY
Qty: 90 TABLET | Refills: 1 | Status: SHIPPED | OUTPATIENT
Start: 2025-04-21

## 2025-04-21 NOTE — TELEPHONE ENCOUNTER
Vanessa called requesting a refill of the below medication which has been pended for you:     Requested Prescriptions     Pending Prescriptions Disp Refills    simvastatin (ZOCOR) 40 MG tablet [Pharmacy Med Name: Simvastatin 40 MG Oral Tablet] 90 tablet 1     Sig: Take 1 tablet by mouth nightly       Last Appointment Date: 11/10/2023  Next Appointment Date: Visit date not found    Allergies   Allergen Reactions    Lipitor [Atorvastatin] Other (See Comments)     Myalgias        Oxycodone Nausea Only    Percocet [Oxycodone-Acetaminophen] Nausea Only    Propoxyphene Nausea Only    Vicodin [Hydrocodone-Acetaminophen] Nausea Only

## 2025-04-29 RX ORDER — OMEPRAZOLE 20 MG/1
20 CAPSULE, DELAYED RELEASE ORAL DAILY
Qty: 30 CAPSULE | Refills: 0 | Status: SHIPPED | OUTPATIENT
Start: 2025-04-29

## 2025-04-29 NOTE — TELEPHONE ENCOUNTER
Vanessa called requesting a refill of the below medication which has been pended for you:     Requested Prescriptions     Pending Prescriptions Disp Refills    omeprazole (PRILOSEC) 20 MG delayed release capsule [Pharmacy Med Name: OMEPRAZOLE 20MG CAP] 30 capsule 0     Sig: Take 1 capsule by mouth once daily       Last Appointment Date: 11/10/2023  Next Appointment Date: Visit date not found

## 2025-07-08 NOTE — PROGRESS NOTES
Chaperone present during sensitive exam: Yes  Name of chaperone: oSphie Jones RN     Subjective:      Patient ID: Kristy Alatorre is a 75 y.o. female. Gastroesophageal Reflux    Hyperlipidemia    Hand Pain     Other  Associated symptoms include arthralgias (hands). Routine follow up on chronic medical conditions, refills, and review of updated labs. I have reviewed the patient's medical history in detail and updated the computerized patient record. ultrasound views of the right breast revealed a complicated cyst.  No solid mass. She  Had excisional biopsy of the mass 10/3/18, benign pathology per patient report. Rare gerd symptoms. She relates symptoms after forgetting her omeprazole one day. Nothing persistent. Feeling well overall. Some joint pain in the fingers/hands. She notices making her bed in the morning harder. Fingers seem stiff. Harder to nicola. No interval falls. She had 2 accidental trips/falls in the past.  Wrist fracture. No recurrent falls. She reports tripping over unseen obstacles. Feeling well overall with no interval illness or injury of concern.       Past Medical History:   Diagnosis Date    GERD (gastroesophageal reflux disease)     High cholesterol     Hormone replacement therapy     Postsurgical menopause 2003    Age 49     Current Outpatient Medications   Medication Sig Dispense Refill    naproxen (NAPROSYN) 500 MG tablet Take 1 tablet by mouth 2 times daily (with meals) 30 tablet 0    simvastatin (ZOCOR) 40 MG tablet Take 1 tablet by mouth nightly 90 tablet 0    omeprazole (PRILOSEC) 20 MG delayed release capsule Take 1 capsule by mouth once daily 90 capsule 3    vitamin B-12 (CYANOCOBALAMIN) 1000 MCG tablet Take 1 tablet by mouth daily      Cholecalciferol (VITAMIN D3) 2000 units CAPS Take by mouth      Multiple Vitamins-Minerals (MULTIVITAMIN PO) Take 1 capsule by mouth daily      Naproxen Sodium 220 MG CAPS Take by mouth as needed for Pain (Patient not taking: Reported on 4/6/2023)      Ascorbic Acid (VITAMIN C) 500 MG tablet Take 1

## (undated) DEVICE — BASIN SET: Brand: MEDLINE INDUSTRIES, INC.

## (undated) DEVICE — 9165 UNIVERSAL PATIENT PLATE: Brand: 3M™

## (undated) DEVICE — PACK,LAPAROTOMY,PK IV,AURORA: Brand: MEDLINE

## (undated) DEVICE — 1200CC GUARDIAN II: Brand: GUARDIAN

## (undated) DEVICE — STRIP,CLOSURE,WOUND,MEDI-STRIP,1/2X4: Brand: MEDLINE

## (undated) DEVICE — SUTURE VCRL SZ 3-0 L27IN ABSRB UD L26MM CT-2 1/2 CIR J232H

## (undated) DEVICE — Z INACTIVE USE 2653177 SPONGE GZ W2XL2IN NONWOVEN 4 PLY FASTER WICKING ABIL AVANT

## (undated) DEVICE — PACK SURG PROC REMINDER N WVN DISPOSABLE BEAC TIME OUT

## (undated) DEVICE — MASTISOL ADHESIVE LIQ 2/3ML

## (undated) DEVICE — MARKER W/PRE PRINTED CUSTOM LABEL

## (undated) DEVICE — 3M™ TEGADERM™ TRANSPARENT FILM DRESSING FRAME STYLE, 1626W, 4 IN X 4-3/4 IN (10 CM X 12 CM), 50/CT 4CT/CASE: Brand: 3M™ TEGADERM™

## (undated) DEVICE — MERCY DEFIANCE ENDO KIT: Brand: MEDLINE INDUSTRIES, INC.

## (undated) DEVICE — COVER LT HNDL BLU PLAS

## (undated) DEVICE — TOWEL,OR,DSP,ST,BLUE,STD,4/PK,20PK/CS: Brand: MEDLINE

## (undated) DEVICE — DRESSING TRNSPAR W2XL2.75IN FLM SHT SEMIPERMEABLE WIND

## (undated) DEVICE — Z DISCONTINUED USE 2624853 GLOVE SURG SZ 75 L12IN THK91MIL BRN LTX FREE

## (undated) DEVICE — 4-PORT MANIFOLD: Brand: NEPTUNE 2

## (undated) DEVICE — TUBING, SUCTION, 3/16" X 20', STRAIGHT: Brand: MEDLINE

## (undated) DEVICE — GAUZE,SPONGE,4"X4",12PLY,STERILE,LF,2'S: Brand: MEDLINE

## (undated) DEVICE — GOWN,AURORA,NONRNF,XL,30/CS: Brand: MEDLINE

## (undated) DEVICE — CHLORAPREP 26ML ORANGE

## (undated) DEVICE — SUTURE VCRL SZ 4-0 L18IN ABSRB UD L19MM PS-2 3/8 CIR PRIM J496H

## (undated) DEVICE — SOLUTION IV IRRIG WATER 1000ML POUR BRL 2F7114

## (undated) DEVICE — CO2 CANNULA,SSOFT,ADLT,7O2,4CO2,FEMALE: Brand: MEDLINE